# Patient Record
Sex: FEMALE | Race: WHITE | NOT HISPANIC OR LATINO | Employment: UNEMPLOYED | URBAN - NONMETROPOLITAN AREA
[De-identification: names, ages, dates, MRNs, and addresses within clinical notes are randomized per-mention and may not be internally consistent; named-entity substitution may affect disease eponyms.]

---

## 2020-10-17 ENCOUNTER — HISTORICAL (OUTPATIENT)
Dept: ADMINISTRATIVE | Facility: HOSPITAL | Age: 45
End: 2020-10-17

## 2022-02-25 LAB
AGE: 45
ALBUMIN SERPL-MCNC: 4.3 G/DL (ref 3.4–5)
ALBUMIN/GLOB SERPL: 1.6 {RATIO}
ALP SERPL-CCNC: 100 U/L (ref 50–144)
ALT SERPL-CCNC: 17 U/L (ref 1–45)
ANION GAP SERPL CALC-SCNC: 6 MMOL/L (ref 2–13)
AST SERPL-CCNC: 25 U/L (ref 14–36)
BASOPHILS # BLD AUTO: 0.04 10*3/UL (ref 0.01–0.08)
BASOPHILS NFR BLD AUTO: 0.6 % (ref 0.1–1.2)
BILIRUB SERPL-MCNC: 0.26 MG/DL (ref 0–1)
BUN SERPL-MCNC: 10 MG/DL (ref 7–20)
CALCIUM SERPL-MCNC: 9 MG/DL (ref 8.4–10.2)
CHLORIDE SERPL-SCNC: 105 MMOL/L (ref 94–110)
CHOLEST SERPL-MCNC: 244 MG/DL (ref 0–200)
CO2 SERPL-SCNC: 26 MMOL/L (ref 21–32)
CREAT SERPL-MCNC: 0.87 MG/DL (ref 0.52–1.04)
CREAT/UREA NIT SERPL: 11.5 (ref 12–20)
DEPRECATED CALCIDIOL+CALCIFEROL SERPL-MC: <20 NG/ML (ref 30–100)
EOSINOPHIL # BLD AUTO: 0.13 10*3/UL (ref 0.04–0.36)
EOSINOPHIL NFR BLD AUTO: 1.9 % (ref 0.7–7)
ERYTHROCYTE [DISTWIDTH] IN BLOOD BY AUTOMATED COUNT: 14.8 % (ref 11–14.5)
EST. AVERAGE GLUCOSE BLD GHB EST-MCNC: 105 MG/DL (ref 70–115)
GLOBULIN SER-MCNC: 2.7 G/DL (ref 2–3.9)
GLUCOSE SERPL-MCNC: 91 MG/DL (ref 70–115)
HBA1C MFR BLD: 5.5 % (ref 4–6)
HCT VFR BLD AUTO: 36 % (ref 36–48)
HDLC SERPL-MCNC: 50 MG/DL (ref 40–60)
HGB BLD-MCNC: 11.8 G/DL (ref 11.8–16)
IMM GRANULOCYTES # BLD AUTO: 0.06 10*3/UL (ref 0–0.03)
IMM GRANULOCYTES NFR BLD AUTO: 0.9 % (ref 0–0.5)
LDLC SERPL CALC-MCNC: 139.9 MG/DL (ref 30–100)
LYMPHOCYTES # BLD AUTO: 2.22 10*3/UL (ref 1.16–3.74)
LYMPHOCYTES NFR BLD AUTO: 32.2 % (ref 20–55)
MANUAL DIFF? (OHS): NORMAL
MCH RBC QN AUTO: 28.8 PG (ref 27–34)
MCHC RBC AUTO-ENTMCNC: 32.8 G/DL (ref 31–37)
MCV RBC AUTO: 87.8 FL (ref 79–99)
MONOCYTES # BLD AUTO: 0.34 10*3/UL (ref 0.24–0.36)
MONOCYTES NFR BLD AUTO: 4.9 % (ref 4.7–12.5)
NEUTROPHILS # BLD AUTO: 4.11 10*3/UL (ref 1.56–6.13)
NEUTROPHILS NFR BLD AUTO: 59.5 % (ref 37–73)
PLATELET # BLD AUTO: 344 10*3/UL (ref 140–371)
PMV BLD AUTO: 8.5 FL (ref 9.4–12.4)
POTASSIUM SERPL-SCNC: 4.5 MMOL/L (ref 3.5–5.1)
PROT SERPL-MCNC: 7 G/DL (ref 6.3–8.2)
RBC # BLD AUTO: 4.1 10*6/UL (ref 4–5.1)
SODIUM SERPL-SCNC: 137 MMOL/L (ref 135–145)
TRIGL SERPL-MCNC: 202 MG/DL (ref 30–200)
TSH SERPL-ACNC: 2.6 M[IU]/L (ref 0.36–3.74)
WBC # SPEC AUTO: 6.9 10*3/UL (ref 4–11.5)

## 2022-05-05 LAB
HUMAN PAPILLOMAVIRUS (HPV): NORMAL
PAP RECOMMENDATION EXT: NORMAL
PAP SMEAR: NORMAL

## 2022-05-15 ENCOUNTER — HISTORICAL (OUTPATIENT)
Dept: ADMINISTRATIVE | Facility: HOSPITAL | Age: 47
End: 2022-05-15

## 2022-05-20 NOTE — HISTORICAL [facility]ER
This is a historical note converted from Nain. Formatting and pictures may have been removed.  Please reference Nain for original formatting and attached multimedia. Chief Complaint  New pt. Former Dr. Lonodno pt  History of Present Illness  46 y/o WF here today to establish care; previous patient of Dr Londono. She has a PMH of GERD, chronic neck pain, GUSTABO,?Insomnia, migraines,?depression with anxiety, smoking 1/2PPD x 30 years-15 pack year hx.  ?  She states that she is doing well in general. She has chronic cervical radiculopathy and she takes Gabapentin 600mg daily, prescribed BID but only takes second dose if needed. Has burning pain down bilateral arms; no loss of motor or sensory function. Hx of cervical neck injury about 6 years ago and had surgery after that.  ?  She is not very active in life, fully independent. 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 Tolerates routine without complaint.  ?  Has acid reflux, takes Pantoprazole and it helps. She has to take it every day, she does not restrict or try to avoid any particular foods as the reflux occurs with ALL foods. Denies difficulty with choking or problems swallowing.  ?  She has a past history?of depression and anxiety.??Is currently coping well, mood is stable. Repeatedly states that she?just does not care about nothing.?She has been on Elavil for years. She denies SI/HI. Last suicide event was after sons death in March 2021. She also has a history of anxiety disorder and has been taking Klonopin, last refill was in November 2021. ?She was open to suggestion that we discontinue?use of that benzo at this time.?She is willing to go to [facility] in office.  ?  Unsure when last labs were done.  +family hx CAD (father), hypothyroid (mother), HLD?and diabetes (father)  Review of Systems  Constitutional:?no fever, fatigue  Eye:?no vision changes, eye redness, drainage, or pain  ENMT:?no sore throat, ear pain, sinus pain/congestion, nasal congestion/drainage  Respiratory:?no  cough, no wheezing, no shortness of breath  Cardiovascular:?no chest pain, no palpitations, no edema  Gastrointestinal:?no nausea, vomiting, diarrhea, or?abdominal pain  Genitourinary:?no dysuria, no urinary frequency or urgency, no hematuria  Hema/Lymph:?no abnormal bruising or bleeding  Endocrine:?no heat or cold intolerance, no excessive thirst or excessive urination  Musculoskeletal:?no muscle or joint pain, no joint swelling  Integumentary:?no skin rash or abnormal lesion  Neurologic: no headache, no dizziness  ?  Physical Exam  Vitals & Measurements  T:?36.4? ?C (Temporal Artery)? HR:?94(Peripheral)? BP:?120/80? SpO2:?98%?  HT:?152.00?cm? WT:?78.200?kg? BMI:?33.85? LMP:?02/25/2022 00:00 CST?  General:?AAOx3, in no acute distress  Eye: clear conjunctiva, eyelids normal  HENT:?TMs/ear canals clear, oropharynx without erythema/exudate  Neck: no thyromegaly or lymphadenopathy  Respiratory:?clear to auscultation bilaterally  Cardiovascular:?regular rate and rhythm without murmurs  Gastrointestinal:?soft, non-tender, with normal bowel sounds?  Integumentary: no rashes present, no peripheral edema  Musculoskeletal: full ROM of all extremities; no vertebral tenderness; steady gait  Neurologic: Cranial nerves grossly intact as tested, no sign of peripheral neurological deficit, motor/sensory function intact  ?  ?  Assessment/Plan  1.?Encounter to establish care ? Z76.89  Labs today; had coffee with cream 3.5 hours ago  2.?Anxiety ? F41.9  Discontinue Klonopin  Start?BuSpar 7.5 mg p.o. twice daily  3.?Depressive disorder ? F32.A  ?Continue Elavil  4.?Insomnia ? G47.00  5.?GERD - Gastro-esophageal reflux disease ? K21.9  Continue pantoprazole 40 mg daily.? Educated patient?on foods?that irritate/exacerbate reflux.? Advised her to avoid such foods,?remains sitting upright?at least an hour after eating.  6.?GUSTABO (iron deficiency anemia) ? D50.9  Labs pending  7.?Obesity ? E66.9  Advise?dietary adjustments?and  implementation?of routine exercise?in order to promote weight loss  8.?Cervical radiculopathy ? M54.12  ?Continue gabapentin?600 mg?daily  9.?Tobacco user ? Z72.0  ?Smoking cessation encouraged  10.?Lipid screening ? Z13.220  11.?Family history of diabetes mellitus ? Z83.3  12.?Thyroid disorder screen ? Z13.29  13.?Medication management ? Z79.899  14.?COVID-19 vaccine series completed ? Z92.29  1 month FU; discuss labs  ?  Patient verbalizes understanding of plan and agrees.? Call w any additional questions, concerns, or issues.? RTC prn or as?above.?   Problem List/Past Medical History  Ongoing  Anxiety  Depressive disorder  Family history of diabetes mellitus  GERD - Gastro-esophageal reflux disease  GUSTABO (iron deficiency anemia)  Insomnia  Medication management  Obesity  Thyroid disorder screen  Tobacco user  Historical  Depression  Procedure/Surgical History  Knee joint operation (2021)  Appendectomy  C Spine Fusion  Caesarean Section   Medications  amitriptyline 100 mg oral tablet, 100 mg= 1 tab(s), Oral, Once a day (at bedtime), 2 refills  busPIRone 7.5 mg oral tablet, 7.5 mg= 1 tab(s), Oral, BID, 2 refills  gabapentin 600 mg oral tablet, 600 mg= 1 tab(s), Oral, Daily, PRN, 2 refills  ibuprofen 600 mg oral tablet, 600 mg= 1 tab(s), Oral, q8hr, PRN, 1 refills  Pantoprazole 40 mg ORAL EC-Tablet, 40 mg= 1 tab(s), Oral, Daily, 2 refills  topiramate 50 mg oral tablet, 50 mg= 1 tab(s), Oral, Daily, 2 refills  Allergies  sertraline?(Urticaria)  Social History  Abuse/Neglect  No, No, Yes, 02/25/2022  Alcohol  Never, 05/15/2018  Substance Use  Current, Marijuana, 1-2 times per month, 02/25/2022  Never, 05/15/2018  Tobacco  10 or more cigarettes (1/2 pack or more)/day in last 30 days, Cigarettes, No, 30 year(s). 17 Years (Age started)., 02/25/2022  Family History  Diabetes mellitus type 2: Father.  Glaucoma.: Mother, Father and Sister.  Hyperlipidemia.: Father.  Hypertension.: Mother and Father.  Hypothyroidism.: Mother  and Sister.  Myocardial infarct: Son.  Immunizations  Vaccine Date Status Comments   COVID-19 mRNA, LNP-S, PF - Moderna 01/28/2022 Recorded    COVID-19 mRNA, LNP-S, PF - Moderna 12/31/2021 Recorded    influenza virus vaccine, inactivated 11/06/2014 Recorded Unit: Unknown  : [facility]   influenza virus vaccine, inactivated 10/01/2009 Recorded    diphtheria/pertussis, acel/tetanus ped 04/10/1991 Recorded    diphtheria/pertussis, acel/tetanus ped 04/29/1981 Recorded    diphtheria/pertussis, acel/tetanus ped 11/23/1979 Recorded    measles/mumps/rubella virus vaccine 08/05/1978 Recorded    diphtheria/pertussis, acel/tetanus ped 01/05/1978 Recorded    diphtheria/pertussis, acel/tetanus ped 1977 Recorded    diphtheria/pertussis, acel/tetanus ped 1977 Recorded    Health Maintenance  Health Maintenance  ???Pending?(in the next year)  ??? ??OverDue  ??? ? ? ?Influenza Vaccine due??10/01/21??and every 1??day(s)  ??? ??Due?  ??? ? ? ?Alcohol Misuse Screening due??01/02/22??and every 1??year(s)  ??? ? ? ?ADL Screening due??02/28/22??and every 1??year(s)  ??? ? ? ?Cervical Cancer Screening due??02/28/22??Unknown Frequency  ??? ? ? ?Tetanus Vaccine due??02/28/22??and every 10??year(s)  ??? ??Due In Future?  ??? ? ? ?Obesity Screening not due until??01/01/23??and every 1??year(s)  ??? ? ? ?Smoking Cessation not due until??01/01/23??and every 1??year(s)  ??? ? ? ?Blood Pressure Screening not due until??02/25/23??and every 1??year(s)  ??? ? ? ?Body Mass Index Check not due until??02/25/23??and every 1??year(s)  ??? ? ? ?Depression Screening not due until??02/25/23??and every 1??year(s)  ???Satisfied?(in the past 1 year)  ??? ??Satisfied?  ??? ? ? ?Blood Pressure Screening on??02/25/22.??Satisfied by Willa Smith LPN D.  ??? ? ? ?Body Mass Index Check on??02/25/22.??Satisfied by Alyssia Smith LPNa D.  ??? ? ? ?Depression Screening on??02/25/22.??Satisfied by Sarah URIBE Willa D.  ??? ? ?  ?Diabetes Screening on??02/25/22.??Satisfied by Contributor_system, [facility]  ??? ? ? ?Influenza Vaccine on??02/25/22.??Satisfied by Willa Smith LPN.  ??? ? ? ?Lipid Screening on??02/25/22.??Satisfied by Contributor_system, [facility]  ??? ? ? ?Obesity Screening on??02/25/22.??Satisfied by Willa Smith LPN.  ??? ? ? ?Smoking Cessation on??02/25/22.??Satisfied by Willa Smith LPN.  ?

## 2022-12-22 ENCOUNTER — HISTORICAL (OUTPATIENT)
Dept: ADMINISTRATIVE | Facility: HOSPITAL | Age: 47
End: 2022-12-22
Payer: MEDICAID

## 2023-01-20 ENCOUNTER — DOCUMENTATION ONLY (OUTPATIENT)
Dept: ADMINISTRATIVE | Facility: HOSPITAL | Age: 48
End: 2023-01-20
Payer: MEDICAID

## 2023-02-06 ENCOUNTER — OFFICE VISIT (OUTPATIENT)
Dept: BEHAVIORAL HEALTH | Facility: CLINIC | Age: 48
End: 2023-02-06
Payer: MEDICAID

## 2023-02-06 VITALS
HEART RATE: 88 BPM | WEIGHT: 188.63 LBS | SYSTOLIC BLOOD PRESSURE: 114 MMHG | DIASTOLIC BLOOD PRESSURE: 74 MMHG | TEMPERATURE: 97 F | OXYGEN SATURATION: 100 %

## 2023-02-06 DIAGNOSIS — G47.00 INSOMNIA, UNSPECIFIED TYPE: ICD-10-CM

## 2023-02-06 DIAGNOSIS — F33.1 MODERATE EPISODE OF RECURRENT MAJOR DEPRESSIVE DISORDER: Primary | ICD-10-CM

## 2023-02-06 DIAGNOSIS — F41.1 GENERALIZED ANXIETY DISORDER: ICD-10-CM

## 2023-02-06 DIAGNOSIS — Z72.0 TOBACCO USER: ICD-10-CM

## 2023-02-06 PROCEDURE — 99213 PR OFFICE/OUTPT VISIT, EST, LEVL III, 20-29 MIN: ICD-10-PCS | Mod: ,,, | Performed by: NURSE PRACTITIONER

## 2023-02-06 PROCEDURE — 99213 OFFICE O/P EST LOW 20 MIN: CPT | Mod: ,,, | Performed by: NURSE PRACTITIONER

## 2023-02-06 RX ORDER — ALBUTEROL SULFATE 90 UG/1
1 AEROSOL, METERED RESPIRATORY (INHALATION) 4 TIMES DAILY PRN
COMMUNITY
Start: 2022-11-22

## 2023-02-06 RX ORDER — ROPINIROLE 0.5 MG/1
0.5 TABLET, FILM COATED ORAL NIGHTLY
COMMUNITY
Start: 2023-01-23 | End: 2023-08-23

## 2023-02-06 RX ORDER — TOPIRAMATE 50 MG/1
50 TABLET, FILM COATED ORAL 2 TIMES DAILY
COMMUNITY
Start: 2023-01-23 | End: 2023-02-06 | Stop reason: SDUPTHER

## 2023-02-06 RX ORDER — ARIPIPRAZOLE 10 MG/1
10 TABLET ORAL DAILY
COMMUNITY
Start: 2023-01-03 | End: 2023-02-06 | Stop reason: SDUPTHER

## 2023-02-06 RX ORDER — PANTOPRAZOLE SODIUM 40 MG/1
40 TABLET, DELAYED RELEASE ORAL DAILY
COMMUNITY
Start: 2023-01-23 | End: 2023-03-23

## 2023-02-06 RX ORDER — ERGOCALCIFEROL 1.25 MG/1
50000 CAPSULE ORAL
COMMUNITY
Start: 2023-01-23 | End: 2023-02-22

## 2023-02-06 RX ORDER — AMITRIPTYLINE HYDROCHLORIDE 150 MG/1
150 TABLET ORAL NIGHTLY
COMMUNITY
Start: 2023-01-23 | End: 2023-02-06 | Stop reason: SDUPTHER

## 2023-02-06 RX ORDER — GABAPENTIN 600 MG/1
600 TABLET ORAL DAILY PRN
COMMUNITY
Start: 2023-01-23 | End: 2023-08-23

## 2023-02-06 RX ORDER — TOPIRAMATE 50 MG/1
50 TABLET, FILM COATED ORAL 2 TIMES DAILY
Qty: 60 TABLET | Refills: 1 | Status: SHIPPED | OUTPATIENT
Start: 2023-02-06 | End: 2023-03-28

## 2023-02-06 RX ORDER — CLONAZEPAM 1 MG/1
1 TABLET ORAL DAILY PRN
COMMUNITY
Start: 2022-12-20 | End: 2023-02-17

## 2023-02-06 RX ORDER — ARIPIPRAZOLE 10 MG/1
10 TABLET ORAL DAILY
Qty: 30 TABLET | Refills: 1 | Status: SHIPPED | OUTPATIENT
Start: 2023-02-06 | End: 2023-04-03 | Stop reason: SDUPTHER

## 2023-02-06 RX ORDER — AMITRIPTYLINE HYDROCHLORIDE 150 MG/1
150 TABLET ORAL NIGHTLY
Qty: 30 TABLET | Refills: 1 | Status: SHIPPED | OUTPATIENT
Start: 2023-02-06 | End: 2023-04-03 | Stop reason: SDUPTHER

## 2023-02-06 NOTE — PROGRESS NOTES
"PSYCHIATRIC FOLLOW-UP VISIT NOTE    Chief Complaint   Patient presents with    Mood     "I have no complaints."         History of Present Illness  48 y.o. year old White female with hx of MDD, OLIMPIA, insomnia, and tobacco use seen today for follow-up appointment and medication management.  States she is been doing well overall since her last visit.  Her daughter gave birth to patient's 1st grandchild earlier this month.  Baby did arrive approximately 2 months early but patient states her daughter and grandson are both doing well at this time.  She was trying to quit smoking and was down to approximately 2 cigarettes per day but due to the high-risk birth she is currently at approximately 5 cigarettes per day.  Reassurance is provided and encouraged patient to continue cessation efforts.  Not interested in medication for this at this time.  Feels moods have been stable she denies any recent depression.  Anxiety has been at a level she is able to cope with effectively.  Sleeping well at night and feels rested in the morning.  Denies any side effects from current medication regimen. Patient denies SI/HI. Denies hallucinations and does not appear to be responding to internal stimuli or be internally preoccupied. No manic symptoms noted.  Tolerating SGA therapy without serious side effects. No NMS s/s. No EPS or TD symptoms noted. AIMS=0.      Objective:     Vitals:  Vitals:    02/06/23 0708   BP: 114/74   BP Location: Left arm   Patient Position: Sitting   Pulse: 88   Temp: 97.3 °F (36.3 °C)   TempSrc: Temporal   SpO2: 100%   Weight: 85.5 kg (188 lb 9.6 oz)       Wt Readings from Last 3 Encounters:   02/06/23 0708 85.5 kg (188 lb 9.6 oz)         Medication:    Current Outpatient Medications:     albuterol (PROVENTIL/VENTOLIN HFA) 90 mcg/actuation inhaler, Inhale 1 puff into the lungs 4 (four) times daily as needed., Disp: , Rfl:     clonazePAM (KLONOPIN) 1 MG tablet, Take 1 mg by mouth daily as needed., Disp: , Rfl:     " "gabapentin (NEURONTIN) 600 MG tablet, Take 600 mg by mouth daily as needed., Disp: , Rfl:     pantoprazole (PROTONIX) 40 MG tablet, Take 40 mg by mouth once daily., Disp: , Rfl:     rOPINIRole (REQUIP) 0.5 MG tablet, Take 0.5 mg by mouth every evening., Disp: , Rfl:     VITAMIN D2 1,250 mcg (50,000 unit) capsule, Take 50,000 Units by mouth every 7 days., Disp: , Rfl:     amitriptyline (ELAVIL) 150 MG Tab, Take 1 tablet (150 mg total) by mouth every evening., Disp: 30 tablet, Rfl: 1    ARIPiprazole (ABILIFY) 10 MG Tab, Take 1 tablet (10 mg total) by mouth once daily., Disp: 30 tablet, Rfl: 1    topiramate (TOPAMAX) 50 MG tablet, Take 1 tablet (50 mg total) by mouth 2 (two) times daily., Disp: 60 tablet, Rfl: 1       Significant Labs: - none at this time    Significant Imaging: - none at this time    Physical Exam  Vitals and nursing note reviewed.   Constitutional:       General: She is awake.      Appearance: Normal appearance.   Musculoskeletal:      Comments: Full ROM   Neurological:      Mental Status: She is alert.   Psychiatric:         Attention and Perception: Attention and perception normal. She does not perceive auditory or visual hallucinations.         Mood and Affect: Affect normal.         Speech: Speech normal.         Behavior: Behavior is cooperative.         Thought Content: Thought content does not include homicidal or suicidal ideation.         Cognition and Memory: Cognition and memory normal.        Review of Systems     Mental Status Exam:  Presentation:  - Appearance: 48 y.o. year old White female, appears stated age, appears Casually dressed and Well groomed  - Motility: Erect when standing, Steady gait, No EPS or Tremors, No psychomotor agitation or retardation appreciated  - Behavior: calm, cooperative, good eye contact  Speech:  - Character/Organization: spontaneous, fluent, normal volume, normal rate, normal rhythm  Emotional State:  - Mood: "happy"   - Affect: congruent and " appropriate  Thought:  - Process: logical, linear, organized , goal-directed  - Preoccupations: no ruminations, rituals, or phobias appreciated  - Delusions: no persecutory, paranoid, or grandiose delusions appreciated  - Perception: denies AVH, not actively responding to internal stimuli  - SI/HI: denies/denies  Sensorium & Intellect:  - Sensorium: AAOx4  - Memory: intact to recent and remote events  - Attention/Concentration: good/good  - Insight/Judgement: good/good    Gait: normal swing and stance  MSK:no rigidity appreciated    All other systems without acute issues unless noted in HPI      Assessment/Plan      ICD-10-CM ICD-9-CM    1. Moderate episode of recurrent major depressive disorder  F33.1 296.32 ARIPiprazole (ABILIFY) 10 MG Tab      amitriptyline (ELAVIL) 150 MG Tab      topiramate (TOPAMAX) 50 MG tablet      2. Generalized anxiety disorder  F41.1 300.02       3. Insomnia, unspecified type  G47.00 780.52       4. Tobacco user  Z72.0 305.1          Continue current medications without change    Encouraged smoking cessation and reinforced negative effects of continued use. Assistance with smoking cessation was offered, including medications, counseling, printed information, and referral to smoking cessation program. Encouraged patient to visit [email or web address] for further information and resources.     checked. Results as expected. No suspected diversion or abuse of controlled substances.    Reviewed non-pharmacologic coping skills to decrease anxiety, such as deep breathing, journaling, exercise, recognizing triggers, meditation, healthy diet and exercise, routine sleep schedule, negative thought recognition, time for hobbies/interests, relaxation techniques, avoidance of substance abuse, limiting caffeine, benefits of counseling, and biofeedback. Patient verbalized understanding.      Follow up in about 2 months (around 4/6/2023) for Medication Management.    DANIKA Varela

## 2023-04-03 ENCOUNTER — OFFICE VISIT (OUTPATIENT)
Dept: BEHAVIORAL HEALTH | Facility: CLINIC | Age: 48
End: 2023-04-03
Payer: MEDICAID

## 2023-04-03 VITALS
SYSTOLIC BLOOD PRESSURE: 114 MMHG | OXYGEN SATURATION: 100 % | HEART RATE: 97 BPM | BODY MASS INDEX: 37.62 KG/M2 | TEMPERATURE: 98 F | HEIGHT: 60 IN | WEIGHT: 191.63 LBS | DIASTOLIC BLOOD PRESSURE: 78 MMHG

## 2023-04-03 DIAGNOSIS — F33.1 MODERATE EPISODE OF RECURRENT MAJOR DEPRESSIVE DISORDER: Primary | ICD-10-CM

## 2023-04-03 DIAGNOSIS — F41.1 GENERALIZED ANXIETY DISORDER: ICD-10-CM

## 2023-04-03 DIAGNOSIS — F17.200 NEEDS SMOKING CESSATION EDUCATION: ICD-10-CM

## 2023-04-03 DIAGNOSIS — G47.00 INSOMNIA, UNSPECIFIED TYPE: ICD-10-CM

## 2023-04-03 DIAGNOSIS — Z72.0 TOBACCO USER: ICD-10-CM

## 2023-04-03 PROCEDURE — 3074F SYST BP LT 130 MM HG: CPT | Mod: CPTII,,, | Performed by: NURSE PRACTITIONER

## 2023-04-03 PROCEDURE — 3078F PR MOST RECENT DIASTOLIC BLOOD PRESSURE < 80 MM HG: ICD-10-PCS | Mod: CPTII,,, | Performed by: NURSE PRACTITIONER

## 2023-04-03 PROCEDURE — 1159F PR MEDICATION LIST DOCUMENTED IN MEDICAL RECORD: ICD-10-PCS | Mod: CPTII,,, | Performed by: NURSE PRACTITIONER

## 2023-04-03 PROCEDURE — 1159F MED LIST DOCD IN RCRD: CPT | Mod: CPTII,,, | Performed by: NURSE PRACTITIONER

## 2023-04-03 PROCEDURE — 99214 PR OFFICE/OUTPT VISIT, EST, LEVL IV, 30-39 MIN: ICD-10-PCS | Mod: ,,, | Performed by: NURSE PRACTITIONER

## 2023-04-03 PROCEDURE — 3008F PR BODY MASS INDEX (BMI) DOCUMENTED: ICD-10-PCS | Mod: CPTII,,, | Performed by: NURSE PRACTITIONER

## 2023-04-03 PROCEDURE — 3074F PR MOST RECENT SYSTOLIC BLOOD PRESSURE < 130 MM HG: ICD-10-PCS | Mod: CPTII,,, | Performed by: NURSE PRACTITIONER

## 2023-04-03 PROCEDURE — 3008F BODY MASS INDEX DOCD: CPT | Mod: CPTII,,, | Performed by: NURSE PRACTITIONER

## 2023-04-03 PROCEDURE — 99214 OFFICE O/P EST MOD 30 MIN: CPT | Mod: ,,, | Performed by: NURSE PRACTITIONER

## 2023-04-03 PROCEDURE — 1160F PR REVIEW ALL MEDS BY PRESCRIBER/CLIN PHARMACIST DOCUMENTED: ICD-10-PCS | Mod: CPTII,,, | Performed by: NURSE PRACTITIONER

## 2023-04-03 PROCEDURE — 1160F RVW MEDS BY RX/DR IN RCRD: CPT | Mod: CPTII,,, | Performed by: NURSE PRACTITIONER

## 2023-04-03 PROCEDURE — 3078F DIAST BP <80 MM HG: CPT | Mod: CPTII,,, | Performed by: NURSE PRACTITIONER

## 2023-04-03 RX ORDER — ARIPIPRAZOLE 10 MG/1
10 TABLET ORAL DAILY
Qty: 30 TABLET | Refills: 1 | Status: SHIPPED | OUTPATIENT
Start: 2023-04-03 | End: 2023-05-30 | Stop reason: SDUPTHER

## 2023-04-03 RX ORDER — AMITRIPTYLINE HYDROCHLORIDE 150 MG/1
150 TABLET ORAL NIGHTLY
Qty: 30 TABLET | Refills: 1 | Status: SHIPPED | OUTPATIENT
Start: 2023-04-03 | End: 2023-07-21

## 2023-04-03 RX ORDER — CLONAZEPAM 1 MG/1
1 TABLET ORAL DAILY PRN
Qty: 15 TABLET | Refills: 1 | Status: SHIPPED | OUTPATIENT
Start: 2023-04-03 | End: 2023-05-23

## 2023-04-03 RX ORDER — TOPIRAMATE 50 MG/1
100 TABLET, FILM COATED ORAL 2 TIMES DAILY
Qty: 60 TABLET | Refills: 1 | Status: SHIPPED | OUTPATIENT
Start: 2023-04-03 | End: 2023-05-30 | Stop reason: SDUPTHER

## 2023-04-03 NOTE — PROGRESS NOTES
PSYCHIATRIC FOLLOW-UP VISIT NOTE    Chief Complaint   Patient presents with    Depression     F/u         History of Present Illness  48 y.o. year old White female with hx of MDD, OLIMPIA, and insomnia seen today for follow-up appointment and medication management.    Objective:     Vitals:  Vitals:    04/03/23 0717   BP: 114/78   BP Location: Left arm   Patient Position: Sitting   Pulse: 97   Temp: 97.9 °F (36.6 °C)   TempSrc: Temporal   SpO2: 100%   Weight: 86.9 kg (191 lb 9.6 oz)   Height: 5' (1.524 m)       Wt Readings from Last 3 Encounters:   04/03/23 0717 86.9 kg (191 lb 9.6 oz)   02/06/23 0708 85.5 kg (188 lb 9.6 oz)         Medication:    Current Outpatient Medications:     albuterol (PROVENTIL/VENTOLIN HFA) 90 mcg/actuation inhaler, Inhale 1 puff into the lungs 4 (four) times daily as needed., Disp: , Rfl:     gabapentin (NEURONTIN) 600 MG tablet, Take 600 mg by mouth daily as needed., Disp: , Rfl:     pantoprazole (PROTONIX) 40 MG tablet, TAKE ONE(1) TAB BY MOUTH ONCE A DAY FOR STOMACH &/OR REFLUX /DO NOT CRUSH OR CHEW, Disp: 30 tablet, Rfl: 5    rOPINIRole (REQUIP) 0.5 MG tablet, Take 0.5 mg by mouth every evening., Disp: , Rfl:     amitriptyline (ELAVIL) 150 MG Tab, Take 1 tablet (150 mg total) by mouth every evening., Disp: 30 tablet, Rfl: 1    ARIPiprazole (ABILIFY) 10 MG Tab, Take 1 tablet (10 mg total) by mouth once daily., Disp: 30 tablet, Rfl: 1    clonazePAM (KLONOPIN) 1 MG tablet, Take 1 tablet (1 mg total) by mouth daily as needed for Anxiety., Disp: 15 tablet, Rfl: 1    topiramate (TOPAMAX) 50 MG tablet, Take 2 tablets (100 mg total) by mouth 2 (two) times daily., Disp: 60 tablet, Rfl: 1    VITAMIN D2 1,250 mcg (50,000 unit) capsule, TAKE ONE(1) CAP BY MOUTH ONCE A WEEK WITH FOOD ON THE SAME DAY EACH WEEK FOR VIT D /DO NOT CRUSH OR CHEW (Patient not taking: Reported on 4/3/2023), Disp: 4 capsule, Rfl: 1       Significant Labs: - none at this time    Significant Imaging: - none at this  "time    Physical Exam  Vitals and nursing note reviewed.   Constitutional:       General: She is awake.      Appearance: Normal appearance.   Musculoskeletal:      Comments: Full ROM   Neurological:      Mental Status: She is alert.   Psychiatric:         Attention and Perception: Attention and perception normal. She does not perceive auditory or visual hallucinations.         Mood and Affect: Affect normal.         Speech: Speech normal.         Behavior: Behavior is cooperative.         Thought Content: Thought content does not include homicidal or suicidal ideation.         Cognition and Memory: Cognition and memory normal.        Review of Systems     Mental Status Exam:  Presentation:  - Appearance: 48 y.o. year old White female, appears stated age, appears Casually dressed and Well groomed  - Motility: Erect when standing, Steady gait, No EPS or Tremors, No psychomotor agitation or retardation appreciated  - Behavior: calm, cooperative, good eye contact  Speech:  - Character/Organization: spontaneous, fluent, normal volume, normal rate, normal rhythm  Emotional State:  - Mood: "pretty good"   - Affect: congruent and appropriate  Thought:  - Process: logical, linear, organized , goal-directed  - Preoccupations: no ruminations, rituals, or phobias appreciated  - Delusions: no persecutory, paranoid, or grandiose delusions appreciated  - Perception: denies AVH, not actively responding to internal stimuli  - SI/HI: denies/denies  Sensorium & Intellect:  - Sensorium: AAOx4  - Memory: intact to recent and remote events  - Attention/Concentration: good/good  - Insight/Judgement: good/good    Gait: normal swing and stance  MSK:no rigidity appreciated    All other systems without acute issues unless noted in HPI      Assessment/Plan      ICD-10-CM ICD-9-CM    1. Moderate episode of recurrent major depressive disorder  F33.1 296.32 amitriptyline (ELAVIL) 150 MG Tab      ARIPiprazole (ABILIFY) 10 MG Tab      topiramate " (TOPAMAX) 50 MG tablet      2. Generalized anxiety disorder  F41.1 300.02 clonazePAM (KLONOPIN) 1 MG tablet      3. Insomnia, unspecified type  G47.00 780.52       4. Tobacco user  Z72.0 305.1          Increase topiramate to 100mg BID for medication associated weight gain    Continue other medications without change    May consider changing Abilify to an alternative SGA if weight gain continues.    Encouraged smoking cessation and reinforced negative effects of continued use. Assistance with smoking cessation was offered, including medications, counseling, printed information, and referral to smoking cessation program. Encouraged patient to visit [email or web address] for further information and resources.    Continue with healthy lifestyle changes    Potential side effects and risks vs benefits of current treatment plan reviewed with patient. Applicable black box warnings reviewed. Encouraged patient not to alter dosages or abruptly discontinue medications without contacting prescriber first, due to risk of worsening symptoms and decompensation of mental status. Warned of risks associated with herbal remedies and supplements while taking psychotropic medications and of the need to consult prescriber prior to adding any of these to current regimen. Patient should abstain from abuse of alcohol, prescription medications, and illicit drugs. Reviewed when to contact clinic and/or seek emergent care, such as but not limited to, onset/worsening SI/HI, hallucinations, delusions, manic symptoms. Pt verbalized understanding and agreement of these warnings/recommendations and verbally consented to treatment plan.        Follow up in about 2 months (around 6/3/2023) for Medication Management.    Maynor Charles, PAULETTEP

## 2023-04-21 ENCOUNTER — TELEPHONE (OUTPATIENT)
Dept: SMOKING CESSATION | Facility: CLINIC | Age: 48
End: 2023-04-21
Payer: MEDICAID

## 2023-04-21 NOTE — TELEPHONE ENCOUNTER
Attempted to contact patient regarding her missed [facility] appointment.  Patient did not answer.  Counselor left a voice message with contact information.

## 2023-05-23 DIAGNOSIS — F41.1 GENERALIZED ANXIETY DISORDER: ICD-10-CM

## 2023-05-23 RX ORDER — TOPIRAMATE 100 MG/1
TABLET, FILM COATED ORAL
Qty: 60 TABLET | Refills: 1 | Status: SHIPPED | OUTPATIENT
Start: 2023-05-23 | End: 2023-05-30 | Stop reason: SDUPTHER

## 2023-05-23 RX ORDER — CLONAZEPAM 1 MG/1
TABLET ORAL
Qty: 15 TABLET | Refills: 1 | Status: SHIPPED | OUTPATIENT
Start: 2023-05-23 | End: 2023-06-23

## 2023-05-30 ENCOUNTER — OFFICE VISIT (OUTPATIENT)
Dept: BEHAVIORAL HEALTH | Facility: CLINIC | Age: 48
End: 2023-05-30
Payer: MEDICAID

## 2023-05-30 VITALS
SYSTOLIC BLOOD PRESSURE: 106 MMHG | OXYGEN SATURATION: 100 % | TEMPERATURE: 97 F | BODY MASS INDEX: 37.3 KG/M2 | DIASTOLIC BLOOD PRESSURE: 76 MMHG | WEIGHT: 190 LBS | HEART RATE: 95 BPM | HEIGHT: 60 IN

## 2023-05-30 DIAGNOSIS — Z72.0 TOBACCO USER: ICD-10-CM

## 2023-05-30 DIAGNOSIS — G47.00 INSOMNIA, UNSPECIFIED TYPE: ICD-10-CM

## 2023-05-30 DIAGNOSIS — F41.1 GENERALIZED ANXIETY DISORDER: ICD-10-CM

## 2023-05-30 DIAGNOSIS — F33.1 MODERATE EPISODE OF RECURRENT MAJOR DEPRESSIVE DISORDER: Primary | ICD-10-CM

## 2023-05-30 PROCEDURE — 1159F PR MEDICATION LIST DOCUMENTED IN MEDICAL RECORD: ICD-10-PCS | Mod: CPTII,,, | Performed by: NURSE PRACTITIONER

## 2023-05-30 PROCEDURE — 3078F PR MOST RECENT DIASTOLIC BLOOD PRESSURE < 80 MM HG: ICD-10-PCS | Mod: CPTII,,, | Performed by: NURSE PRACTITIONER

## 2023-05-30 PROCEDURE — 1159F MED LIST DOCD IN RCRD: CPT | Mod: CPTII,,, | Performed by: NURSE PRACTITIONER

## 2023-05-30 PROCEDURE — 3008F BODY MASS INDEX DOCD: CPT | Mod: CPTII,,, | Performed by: NURSE PRACTITIONER

## 2023-05-30 PROCEDURE — 1160F RVW MEDS BY RX/DR IN RCRD: CPT | Mod: CPTII,,, | Performed by: NURSE PRACTITIONER

## 2023-05-30 PROCEDURE — 90833 PSYTX W PT W E/M 30 MIN: CPT | Mod: ,,, | Performed by: NURSE PRACTITIONER

## 2023-05-30 PROCEDURE — 3008F PR BODY MASS INDEX (BMI) DOCUMENTED: ICD-10-PCS | Mod: CPTII,,, | Performed by: NURSE PRACTITIONER

## 2023-05-30 PROCEDURE — 3074F SYST BP LT 130 MM HG: CPT | Mod: CPTII,,, | Performed by: NURSE PRACTITIONER

## 2023-05-30 PROCEDURE — 90833 PR PSYCHOTHERAPY W/PATIENT W/E&M, 30 MIN (ADD ON): ICD-10-PCS | Mod: ,,, | Performed by: NURSE PRACTITIONER

## 2023-05-30 PROCEDURE — 3078F DIAST BP <80 MM HG: CPT | Mod: CPTII,,, | Performed by: NURSE PRACTITIONER

## 2023-05-30 PROCEDURE — 1160F PR REVIEW ALL MEDS BY PRESCRIBER/CLIN PHARMACIST DOCUMENTED: ICD-10-PCS | Mod: CPTII,,, | Performed by: NURSE PRACTITIONER

## 2023-05-30 PROCEDURE — 99214 OFFICE O/P EST MOD 30 MIN: CPT | Mod: ,,, | Performed by: NURSE PRACTITIONER

## 2023-05-30 PROCEDURE — 3074F PR MOST RECENT SYSTOLIC BLOOD PRESSURE < 130 MM HG: ICD-10-PCS | Mod: CPTII,,, | Performed by: NURSE PRACTITIONER

## 2023-05-30 PROCEDURE — 99214 PR OFFICE/OUTPT VISIT, EST, LEVL IV, 30-39 MIN: ICD-10-PCS | Mod: ,,, | Performed by: NURSE PRACTITIONER

## 2023-05-30 RX ORDER — TOPIRAMATE 100 MG/1
100 TABLET, FILM COATED ORAL 2 TIMES DAILY
Qty: 60 TABLET | Refills: 1 | Status: SHIPPED | OUTPATIENT
Start: 2023-05-30 | End: 2023-07-21

## 2023-05-30 RX ORDER — ARIPIPRAZOLE 10 MG/1
10 TABLET ORAL DAILY
Qty: 30 TABLET | Refills: 1 | Status: SHIPPED | OUTPATIENT
Start: 2023-05-30 | End: 2023-08-02 | Stop reason: SDUPTHER

## 2023-05-30 NOTE — PROGRESS NOTES
"PSYCHIATRIC FOLLOW-UP VISIT NOTE    Chief Complaint   Patient presents with    Mood     "I have no complaints."         History of Present Illness  48 y.o. year old White female with hx of MD, OLIMPIA, insomnia, and tobacco use seen today for follow-up appointment and medication management.  Reports she is been doing well overall since her last visit.  Denies any recent depression.  No anhedonia, isolative behaviors, irritability, feelings of guilt and shame, or thoughts of self-harm.  Anxiety also well-controlled for the most part.  Denies any moderate to severe symptoms and denies any recent panic attacks.  She is sleeping well and feels rested in the morning.  Denies any side effects from current medication regimen.  There is continued family discord between herself and her mother due to mother's poor boundaries with patient's daughter and granddaughter.  Does not feel this is a severe stressor at this time but it was something she was trying to keep under control so that my daughter does not have to deal with it.  We spent some time discussing communication skills and keeping in setting boundaries. Patient denies SI/HI. Denies hallucinations and does not appear to be responding to internal stimuli or be internally preoccupied. No manic symptoms noted. Tolerating SGA therapy without serious side effects. No NMS s/s. No EPS or TD symptoms noted. AIMS=0.        Objective:     Vitals:  Vitals:    05/30/23 0706   BP: 106/76   BP Location: Left arm   Patient Position: Sitting   Pulse: 95   Temp: 97.2 °F (36.2 °C)   TempSrc: Temporal   SpO2: 100%   Weight: 86.2 kg (190 lb)   Height: 5' (1.524 m)       Wt Readings from Last 3 Encounters:   05/30/23 0706 86.2 kg (190 lb)   04/03/23 0717 86.9 kg (191 lb 9.6 oz)   02/06/23 0708 85.5 kg (188 lb 9.6 oz)         Medication:    Current Outpatient Medications:     albuterol (PROVENTIL/VENTOLIN HFA) 90 mcg/actuation inhaler, Inhale 1 puff into the lungs 4 (four) times daily as " needed., Disp: , Rfl:     amitriptyline (ELAVIL) 150 MG Tab, Take 1 tablet (150 mg total) by mouth every evening., Disp: 30 tablet, Rfl: 1    clonazePAM (KLONOPIN) 1 MG tablet, TAKE ONE(1) TAB BY MOUTH ONCE A DAY AS NEEDED FOR ANXIETY, Disp: 15 tablet, Rfl: 1    gabapentin (NEURONTIN) 600 MG tablet, Take 600 mg by mouth daily as needed., Disp: , Rfl:     pantoprazole (PROTONIX) 40 MG tablet, TAKE ONE(1) TAB BY MOUTH ONCE A DAY FOR STOMACH &/OR REFLUX /DO NOT CRUSH OR CHEW, Disp: 30 tablet, Rfl: 5    rOPINIRole (REQUIP) 0.5 MG tablet, Take 0.5 mg by mouth every evening., Disp: , Rfl:     ARIPiprazole (ABILIFY) 10 MG Tab, Take 1 tablet (10 mg total) by mouth once daily., Disp: 30 tablet, Rfl: 1    topiramate (TOPAMAX) 100 MG tablet, Take 1 tablet (100 mg total) by mouth 2 (two) times daily., Disp: 60 tablet, Rfl: 1       Significant Labs: - none at this time    Significant Imaging: - none at this time    Physical Exam  Vitals and nursing note reviewed.   Constitutional:       General: She is awake.      Appearance: Normal appearance.   Musculoskeletal:      Comments: Full ROM   Neurological:      Mental Status: She is alert.   Psychiatric:         Attention and Perception: Attention and perception normal. She does not perceive auditory or visual hallucinations.         Mood and Affect: Affect normal.         Speech: Speech normal.         Behavior: Behavior is cooperative.         Thought Content: Thought content does not include homicidal or suicidal ideation.         Cognition and Memory: Cognition and memory normal.        Review of Systems     Mental Status Exam:  Presentation:  - Appearance: 48 y.o. year old White female, appears stated age, appears Casually dressed and Well groomed  - Motility: Erect when standing, Steady gait, No EPS or Tremors, No psychomotor agitation or retardation appreciated  - Behavior: calm, cooperative, good eye contact  Speech:  - Character/Organization: spontaneous, fluent, normal  "volume, normal rate, normal rhythm  Emotional State:  - Mood: "happy"   - Affect: congruent and appropriate  Thought:  - Process: logical, linear, organized , goal-directed  - Preoccupations: no ruminations, rituals, or phobias appreciated  - Delusions: no persecutory, paranoid, or grandiose delusions appreciated  - Perception: denies AVH, not actively responding to internal stimuli  - SI/HI: denies/denies  Sensorium & Intellect:  - Sensorium: AAOx4  - Memory: intact to recent and remote events  - Attention/Concentration: good/good  - Insight/Judgement: good/good    Gait: normal swing and stance  MSK:no rigidity appreciated    All other systems without acute issues unless noted in HPI      Assessment/Plan      ICD-10-CM ICD-9-CM    1. Moderate episode of recurrent major depressive disorder  F33.1 296.32 ARIPiprazole (ABILIFY) 10 MG Tab      2. Generalized anxiety disorder  F41.1 300.02       3. Insomnia, unspecified type  G47.00 780.52       4. Tobacco user  Z72.0 305.1          Continue current medications without change    Potential side effects and risks vs benefits of current treatment plan reviewed with patient. Applicable black box warnings reviewed. Encouraged patient not to alter dosages or abruptly discontinue medications without contacting prescriber first, due to risk of worsening symptoms and decompensation of mental status. Warned of risks associated with herbal remedies and supplements while taking psychotropic medications and of the need to consult prescriber prior to adding any of these to current regimen. Patient should abstain from abuse of alcohol, prescription medications, and illicit drugs. Reviewed when to contact clinic and/or seek emergent care, such as but not limited to, onset/worsening SI/HI, hallucinations, delusions, manic symptoms. Pt verbalized understanding and agreement of these warnings/recommendations and verbally consented to treatment plan.     checked. Results as expected. No " suspected diversion or abuse of controlled substances.    Excess of 16 minutes spent discussing communication strategies and keeping in setting boundaries to decrease family discord between patient, her mother, her child, and her grandchild.  Brief supportive therapy techniques employed    Follow up in about 2 months (around 7/30/2023) for Medication Management.    Maynor Charles, PAULETTEP

## 2023-06-23 DIAGNOSIS — F41.1 GENERALIZED ANXIETY DISORDER: ICD-10-CM

## 2023-06-23 RX ORDER — CLONAZEPAM 1 MG/1
TABLET ORAL
Qty: 15 TABLET | Refills: 1 | Status: SHIPPED | OUTPATIENT
Start: 2023-06-23 | End: 2023-08-02 | Stop reason: SDUPTHER

## 2023-07-21 DIAGNOSIS — F33.1 MODERATE EPISODE OF RECURRENT MAJOR DEPRESSIVE DISORDER: ICD-10-CM

## 2023-07-21 RX ORDER — AMITRIPTYLINE HYDROCHLORIDE 150 MG/1
TABLET ORAL
Qty: 30 TABLET | Refills: 1 | Status: SHIPPED | OUTPATIENT
Start: 2023-07-21 | End: 2023-08-02 | Stop reason: SDUPTHER

## 2023-07-21 RX ORDER — TOPIRAMATE 100 MG/1
TABLET, FILM COATED ORAL
Qty: 60 TABLET | Refills: 1 | Status: SHIPPED | OUTPATIENT
Start: 2023-07-21 | End: 2024-01-19 | Stop reason: SDUPTHER

## 2023-08-02 ENCOUNTER — OFFICE VISIT (OUTPATIENT)
Dept: BEHAVIORAL HEALTH | Facility: CLINIC | Age: 48
End: 2023-08-02
Payer: MEDICAID

## 2023-08-02 VITALS
HEART RATE: 94 BPM | WEIGHT: 191 LBS | TEMPERATURE: 98 F | DIASTOLIC BLOOD PRESSURE: 78 MMHG | HEIGHT: 60 IN | BODY MASS INDEX: 37.5 KG/M2 | OXYGEN SATURATION: 100 % | SYSTOLIC BLOOD PRESSURE: 136 MMHG

## 2023-08-02 DIAGNOSIS — G47.00 INSOMNIA, UNSPECIFIED TYPE: ICD-10-CM

## 2023-08-02 DIAGNOSIS — F41.1 GENERALIZED ANXIETY DISORDER: ICD-10-CM

## 2023-08-02 DIAGNOSIS — Z72.0 TOBACCO USER: ICD-10-CM

## 2023-08-02 DIAGNOSIS — F33.1 MODERATE EPISODE OF RECURRENT MAJOR DEPRESSIVE DISORDER: Primary | ICD-10-CM

## 2023-08-02 PROCEDURE — 3008F PR BODY MASS INDEX (BMI) DOCUMENTED: ICD-10-PCS | Mod: CPTII,,, | Performed by: NURSE PRACTITIONER

## 2023-08-02 PROCEDURE — 3078F DIAST BP <80 MM HG: CPT | Mod: CPTII,,, | Performed by: NURSE PRACTITIONER

## 2023-08-02 PROCEDURE — 1159F PR MEDICATION LIST DOCUMENTED IN MEDICAL RECORD: ICD-10-PCS | Mod: CPTII,,, | Performed by: NURSE PRACTITIONER

## 2023-08-02 PROCEDURE — 99214 PR OFFICE/OUTPT VISIT, EST, LEVL IV, 30-39 MIN: ICD-10-PCS | Mod: ,,, | Performed by: NURSE PRACTITIONER

## 2023-08-02 PROCEDURE — 3075F SYST BP GE 130 - 139MM HG: CPT | Mod: CPTII,,, | Performed by: NURSE PRACTITIONER

## 2023-08-02 PROCEDURE — 3008F BODY MASS INDEX DOCD: CPT | Mod: CPTII,,, | Performed by: NURSE PRACTITIONER

## 2023-08-02 PROCEDURE — 1160F PR REVIEW ALL MEDS BY PRESCRIBER/CLIN PHARMACIST DOCUMENTED: ICD-10-PCS | Mod: CPTII,,, | Performed by: NURSE PRACTITIONER

## 2023-08-02 PROCEDURE — 99214 OFFICE O/P EST MOD 30 MIN: CPT | Mod: ,,, | Performed by: NURSE PRACTITIONER

## 2023-08-02 PROCEDURE — 1159F MED LIST DOCD IN RCRD: CPT | Mod: CPTII,,, | Performed by: NURSE PRACTITIONER

## 2023-08-02 PROCEDURE — 1160F RVW MEDS BY RX/DR IN RCRD: CPT | Mod: CPTII,,, | Performed by: NURSE PRACTITIONER

## 2023-08-02 PROCEDURE — 3078F PR MOST RECENT DIASTOLIC BLOOD PRESSURE < 80 MM HG: ICD-10-PCS | Mod: CPTII,,, | Performed by: NURSE PRACTITIONER

## 2023-08-02 PROCEDURE — 3075F PR MOST RECENT SYSTOLIC BLOOD PRESS GE 130-139MM HG: ICD-10-PCS | Mod: CPTII,,, | Performed by: NURSE PRACTITIONER

## 2023-08-02 RX ORDER — AMITRIPTYLINE HYDROCHLORIDE 150 MG/1
150 TABLET ORAL NIGHTLY
Qty: 30 TABLET | Refills: 1 | Status: SHIPPED | OUTPATIENT
Start: 2023-08-02 | End: 2023-09-19 | Stop reason: SDUPTHER

## 2023-08-02 RX ORDER — CLONAZEPAM 1 MG/1
1 TABLET ORAL DAILY
Qty: 15 TABLET | Refills: 1 | Status: SHIPPED | OUTPATIENT
Start: 2023-08-02 | End: 2023-09-19 | Stop reason: SDUPTHER

## 2023-08-02 RX ORDER — ARIPIPRAZOLE 10 MG/1
10 TABLET ORAL DAILY
Qty: 30 TABLET | Refills: 1 | Status: SHIPPED | OUTPATIENT
Start: 2023-08-02 | End: 2023-09-19 | Stop reason: SDUPTHER

## 2023-08-02 NOTE — PROGRESS NOTES
"PSYCHIATRIC FOLLOW-UP VISIT NOTE    Chief Complaint   Patient presents with    Mood     "I have been doing well. I have no complaints."         History of Present Illness  48 y.o. year old White female with hx of MDD, neeta, insomnia, and tobacco use seen today for follow-up appointment and medication management.  Patient reports she is been doing very well since last visit.  Denies any recent depression.  No mood lability or irritability reported.  Describes mood is happy most days of the week.  Spends much per free time baby-sitting her grandson and enjoys their time together.  Still some discord with her mother the patient has put up appropriate boundaries and is able to cope more effectively whenever her mother's dysfunctional behaviors occur.  When prolonged interaction with the mother is required this has been she uses her p.r.n. clonazepam.  States this usually occurs once or twice per week.  Good efficacy with the medication.  No suspected diversion or abuse.   checked.  She is sleeping well at night and feels rested in the morning.  Denies any side effects from current medication regimen.  No change in tobacco use and not motivated to quit at this time. Patient denies SI/HI. Denies hallucinations and does not appear to be responding to internal stimuli or be internally preoccupied. No manic symptoms noted. Tolerating SGA therapy without serious side effects. No NMS s/s. No EPS or TD symptoms noted. AIMS=0.        Objective:     Vitals:  Vitals:    08/02/23 0703   BP: 136/78   BP Location: Left arm   Patient Position: Sitting   Pulse: 94   Temp: 97.9 °F (36.6 °C)   TempSrc: Temporal   SpO2: 100%   Weight: 86.6 kg (191 lb)   Height: 5' (1.524 m)       Wt Readings from Last 3 Encounters:   08/02/23 0703 86.6 kg (191 lb)   05/30/23 0706 86.2 kg (190 lb)   04/03/23 0717 86.9 kg (191 lb 9.6 oz)         Medication:    Current Outpatient Medications:     gabapentin (NEURONTIN) 600 MG tablet, Take 600 mg by mouth " daily as needed., Disp: , Rfl:     pantoprazole (PROTONIX) 40 MG tablet, TAKE ONE(1) TAB BY MOUTH ONCE A DAY FOR STOMACH &/OR REFLUX /DO NOT CRUSH OR CHEW, Disp: 30 tablet, Rfl: 5    rOPINIRole (REQUIP) 0.5 MG tablet, Take 0.5 mg by mouth every evening., Disp: , Rfl:     topiramate (TOPAMAX) 100 MG tablet, TAKE ONE(1) TAB BY MOUTH TWICE DAILY WITH FULL GLASS OF WATER TO HELP PREVENT HEADACHE &/OR MOOD, Disp: 60 tablet, Rfl: 1    albuterol (PROVENTIL/VENTOLIN HFA) 90 mcg/actuation inhaler, Inhale 1 puff into the lungs 4 (four) times daily as needed., Disp: , Rfl:     amitriptyline (ELAVIL) 150 MG Tab, Take 1 tablet (150 mg total) by mouth every evening., Disp: 30 tablet, Rfl: 1    ARIPiprazole (ABILIFY) 10 MG Tab, Take 1 tablet (10 mg total) by mouth once daily., Disp: 30 tablet, Rfl: 1    clonazePAM (KLONOPIN) 1 MG tablet, Take 1 tablet (1 mg total) by mouth once daily., Disp: 15 tablet, Rfl: 1       Significant Labs: - none at this time    Significant Imaging: - none at this time    Physical Exam  Vitals and nursing note reviewed.   Constitutional:       General: She is awake.      Appearance: Normal appearance.   Musculoskeletal:      Comments: Full ROM   Neurological:      Mental Status: She is alert.   Psychiatric:         Attention and Perception: Attention and perception normal. She does not perceive auditory or visual hallucinations.         Mood and Affect: Affect normal.         Speech: Speech normal.         Behavior: Behavior is cooperative.         Thought Content: Thought content does not include homicidal or suicidal ideation.         Cognition and Memory: Cognition and memory normal.          Review of Systems     Mental Status Exam:  Presentation:  - Appearance: 48 y.o. year old White female, appears stated age, appears Casually dressed and Well groomed  - Motility: Erect when standing, Steady gait, No EPS or Tremors, No psychomotor agitation or retardation appreciated  - Behavior: calm, cooperative,  "good eye contact  Speech:  - Character/Organization: spontaneous, fluent, normal volume, normal rate, normal rhythm  Emotional State:  - Mood: "happy"   - Affect: congruent and appropriate  Thought:  - Process: logical, linear, organized , goal-directed  - Preoccupations: no ruminations, rituals, or phobias appreciated  - Delusions: no persecutory, paranoid, or grandiose delusions appreciated  - Perception: denies AVH, not actively responding to internal stimuli  - SI/HI: denies/denies  Sensorium & Intellect:  - Sensorium: AAOx4  - Memory: intact to recent and remote events  - Attention/Concentration: good/good  - Insight/Judgement: good/good    Gait: normal swing and stance  MSK:no rigidity appreciated    All other systems without acute issues unless noted in HPI      Assessment/Plan      ICD-10-CM ICD-9-CM    1. Moderate episode of recurrent major depressive disorder  F33.1 296.32 amitriptyline (ELAVIL) 150 MG Tab      ARIPiprazole (ABILIFY) 10 MG Tab      2. Generalized anxiety disorder  F41.1 300.02 clonazePAM (KLONOPIN) 1 MG tablet      3. Insomnia, unspecified type  G47.00 780.52       4. Tobacco user  Z72.0 305.1          Continue current medications without change    Potential side effects and risks vs benefits of current treatment plan reviewed with patient. Applicable black box warnings reviewed. Encouraged patient not to alter dosages or abruptly discontinue medications without contacting prescriber first, due to risk of worsening symptoms and decompensation of mental status. Warned of risks associated with herbal remedies and supplements while taking psychotropic medications and of the need to consult prescriber prior to adding any of these to current regimen. Patient should abstain from abuse of alcohol, prescription medications, and illicit drugs. Reviewed when to contact clinic and/or seek emergent care, such as but not limited to, onset/worsening SI/HI, hallucinations, delusions, manic symptoms. Pt " verbalized understanding and agreement of these warnings/recommendations and verbally consented to treatment plan.    Encouraged smoking cessation and reinforced negative effects of continued use. Assistance with smoking cessation was offered, including medications, counseling, printed information, and referral to smoking cessation program. Encouraged patient to visit [email or web address] for further information and resources.      Follow up in about 2 months (around 10/2/2023) for Medication Management.    PAULETTE VarelaP

## 2023-08-21 PROCEDURE — 83036 HEMOGLOBIN GLYCOSYLATED A1C: CPT | Performed by: NURSE PRACTITIONER

## 2023-08-21 PROCEDURE — 85025 COMPLETE CBC W/AUTO DIFF WBC: CPT | Performed by: NURSE PRACTITIONER

## 2023-08-21 PROCEDURE — 82306 VITAMIN D 25 HYDROXY: CPT | Performed by: NURSE PRACTITIONER

## 2023-08-21 PROCEDURE — 84443 ASSAY THYROID STIM HORMONE: CPT | Performed by: NURSE PRACTITIONER

## 2023-08-21 PROCEDURE — 80061 LIPID PANEL: CPT | Performed by: NURSE PRACTITIONER

## 2023-08-21 PROCEDURE — 80053 COMPREHEN METABOLIC PANEL: CPT | Performed by: NURSE PRACTITIONER

## 2023-08-23 DIAGNOSIS — G62.9 NEUROPATHY: ICD-10-CM

## 2023-08-23 DIAGNOSIS — G25.81 RESTLESS LEG: Primary | ICD-10-CM

## 2023-08-23 RX ORDER — ROPINIROLE 0.5 MG/1
TABLET, FILM COATED ORAL
Qty: 30 TABLET | Refills: 0 | Status: SHIPPED | OUTPATIENT
Start: 2023-08-23 | End: 2023-09-20

## 2023-08-23 RX ORDER — GABAPENTIN 600 MG/1
TABLET ORAL
Qty: 30 TABLET | Refills: 0 | Status: SHIPPED | OUTPATIENT
Start: 2023-08-23 | End: 2023-09-20

## 2023-08-28 ENCOUNTER — OFFICE VISIT (OUTPATIENT)
Dept: FAMILY MEDICINE | Facility: CLINIC | Age: 48
End: 2023-08-28
Payer: MEDICAID

## 2023-08-28 VITALS
HEART RATE: 94 BPM | BODY MASS INDEX: 37.5 KG/M2 | HEIGHT: 60 IN | DIASTOLIC BLOOD PRESSURE: 80 MMHG | SYSTOLIC BLOOD PRESSURE: 132 MMHG | WEIGHT: 191 LBS | OXYGEN SATURATION: 99 % | TEMPERATURE: 98 F

## 2023-08-28 DIAGNOSIS — G25.81 RESTLESS LEGS SYNDROME: ICD-10-CM

## 2023-08-28 DIAGNOSIS — Z00.00 ANNUAL PHYSICAL EXAM: Primary | ICD-10-CM

## 2023-08-28 DIAGNOSIS — Z86.39 H/O VITAMIN D DEFICIENCY: ICD-10-CM

## 2023-08-28 DIAGNOSIS — F41.1 GENERALIZED ANXIETY DISORDER: ICD-10-CM

## 2023-08-28 DIAGNOSIS — K21.9 GASTROESOPHAGEAL REFLUX DISEASE WITHOUT ESOPHAGITIS: ICD-10-CM

## 2023-08-28 DIAGNOSIS — Z72.0 TOBACCO USER: ICD-10-CM

## 2023-08-28 DIAGNOSIS — D50.8 IRON DEFICIENCY ANEMIA SECONDARY TO INADEQUATE DIETARY IRON INTAKE: ICD-10-CM

## 2023-08-28 DIAGNOSIS — M54.12 CHRONIC CERVICAL RADICULOPATHY: ICD-10-CM

## 2023-08-28 DIAGNOSIS — F32.A DEPRESSIVE DISORDER: ICD-10-CM

## 2023-08-28 DIAGNOSIS — E66.09 CLASS 2 OBESITY DUE TO EXCESS CALORIES WITHOUT SERIOUS COMORBIDITY WITH BODY MASS INDEX (BMI) OF 37.0 TO 37.9 IN ADULT: ICD-10-CM

## 2023-08-28 PROBLEM — Z83.3 FAMILY HISTORY OF DIABETES MELLITUS: Status: ACTIVE | Noted: 2023-08-28

## 2023-08-28 PROBLEM — N28.9 RENAL INSUFFICIENCY: Status: ACTIVE | Noted: 2023-08-28

## 2023-08-28 PROBLEM — D50.9 IRON DEFICIENCY ANEMIA: Status: ACTIVE | Noted: 2023-08-28

## 2023-08-28 PROBLEM — E66.812 CLASS 2 OBESITY DUE TO EXCESS CALORIES WITHOUT SERIOUS COMORBIDITY WITH BODY MASS INDEX (BMI) OF 37.0 TO 37.9 IN ADULT: Status: ACTIVE | Noted: 2023-08-28

## 2023-08-28 PROBLEM — Z86.19 HISTORY OF POSITIVE PCR FOR HERPES SIMPLEX VIRUS TYPE 1 (HSV-1) DNA: Status: ACTIVE | Noted: 2023-08-28

## 2023-08-28 PROBLEM — Z91.51 HISTORY OF SUICIDE ATTEMPT: Status: ACTIVE | Noted: 2023-08-28

## 2023-08-28 PROBLEM — E66.9 OBESITY: Status: ACTIVE | Noted: 2023-08-28

## 2023-08-28 PROCEDURE — 1159F PR MEDICATION LIST DOCUMENTED IN MEDICAL RECORD: ICD-10-PCS | Mod: CPTII,,, | Performed by: NURSE PRACTITIONER

## 2023-08-28 PROCEDURE — 1159F MED LIST DOCD IN RCRD: CPT | Mod: CPTII,,, | Performed by: NURSE PRACTITIONER

## 2023-08-28 PROCEDURE — 3008F BODY MASS INDEX DOCD: CPT | Mod: CPTII,,, | Performed by: NURSE PRACTITIONER

## 2023-08-28 PROCEDURE — 3008F PR BODY MASS INDEX (BMI) DOCUMENTED: ICD-10-PCS | Mod: CPTII,,, | Performed by: NURSE PRACTITIONER

## 2023-08-28 PROCEDURE — 3079F DIAST BP 80-89 MM HG: CPT | Mod: CPTII,,, | Performed by: NURSE PRACTITIONER

## 2023-08-28 PROCEDURE — 3044F PR MOST RECENT HEMOGLOBIN A1C LEVEL <7.0%: ICD-10-PCS | Mod: CPTII,,, | Performed by: NURSE PRACTITIONER

## 2023-08-28 PROCEDURE — 99396 PR PREVENTIVE VISIT,EST,40-64: ICD-10-PCS | Mod: ,,, | Performed by: NURSE PRACTITIONER

## 2023-08-28 PROCEDURE — 3079F PR MOST RECENT DIASTOLIC BLOOD PRESSURE 80-89 MM HG: ICD-10-PCS | Mod: CPTII,,, | Performed by: NURSE PRACTITIONER

## 2023-08-28 PROCEDURE — 3075F SYST BP GE 130 - 139MM HG: CPT | Mod: CPTII,,, | Performed by: NURSE PRACTITIONER

## 2023-08-28 PROCEDURE — 3075F PR MOST RECENT SYSTOLIC BLOOD PRESS GE 130-139MM HG: ICD-10-PCS | Mod: CPTII,,, | Performed by: NURSE PRACTITIONER

## 2023-08-28 PROCEDURE — 1160F PR REVIEW ALL MEDS BY PRESCRIBER/CLIN PHARMACIST DOCUMENTED: ICD-10-PCS | Mod: CPTII,,, | Performed by: NURSE PRACTITIONER

## 2023-08-28 PROCEDURE — 1160F RVW MEDS BY RX/DR IN RCRD: CPT | Mod: CPTII,,, | Performed by: NURSE PRACTITIONER

## 2023-08-28 PROCEDURE — 99396 PREV VISIT EST AGE 40-64: CPT | Mod: ,,, | Performed by: NURSE PRACTITIONER

## 2023-08-28 PROCEDURE — 3044F HG A1C LEVEL LT 7.0%: CPT | Mod: CPTII,,, | Performed by: NURSE PRACTITIONER

## 2023-08-28 RX ORDER — FERROUS SULFATE 325(65) MG
325 TABLET ORAL
Qty: 45 TABLET | Refills: 3 | Status: SHIPPED | OUTPATIENT
Start: 2023-08-28 | End: 2023-08-28 | Stop reason: SDUPTHER

## 2023-08-28 RX ORDER — FERROUS SULFATE 325(65) MG
325 TABLET ORAL EVERY OTHER DAY
Qty: 45 TABLET | Refills: 3 | Status: SHIPPED | OUTPATIENT
Start: 2023-08-28

## 2023-08-28 NOTE — ASSESSMENT & PLAN NOTE
Take medication: as needed  Preventive Measures:   Avoid spicy, acidic, fried foods and alcohol.  Eat 2-3 hours before going to bed. Remain upright for 30-60 minutes after eating.   Avoid tight clothing, chew food thoroughly.  Reduce caffeine intake, avoid soda.  Stressed importance of Smoking/Tobacco Cessation.

## 2023-08-28 NOTE — PROGRESS NOTES
Patient ID: Kelly Smith  : 1975    Chief Complaint: Annual Exam    Allergies: Patient is allergic to sertraline.     History of Present Illness:  The patient is a 48 y.o. White female who presents to clinic for annual wellness visit.      Here for wellness. She is feeling well in general. She is however, unhappy with her current weight. She feels that she is eating healthy and is working out 4 times weekly but cannot seem to lose anything.     She has hx of GUSTABO. Chewing on ice throughout the visit and reports that she has been doing this over the last couple months. She denies other symptoms of anemia. Denies bleeding, no melena. She denies heavy menstruation. She was on iron supplementation previously but has not been taking that in a while.     Denies changes in bowel patterns, no melena, hematochezia, rectal pain, rectal bleeding, or changes in caliber of stool. No family hx colon cancer.     Established with mental health provider.         Past Medical History:  has no past medical history on file.    Surgical History:  has a past surgical history that includes Appendectomy and  section.    Family History: family history includes Anxiety disorder in her mother; Depression in her mother; No Known Problems in her brother, cousin, father, maternal aunt, maternal grandfather, maternal grandmother, maternal uncle, paternal aunt, paternal grandfather, paternal grandmother, paternal uncle, sister, and another family member.    Social History:  reports that she has been smoking cigarettes. She has been exposed to tobacco smoke. She has never used smokeless tobacco. She reports that she does not currently use alcohol. She reports that she does not currently use drugs.    Care Team: Patient Care Team:  Ayde Stanford FNP-C as PCP - General (Family Medicine)  Krysten Hess NP as Nurse Practitioner (Obstetrics and Gynecology)  Maynor Charles PMHNP as Nurse Practitioner  (Psychiatry)     Current Medications:  Current Outpatient Medications   Medication Instructions    albuterol (PROVENTIL/VENTOLIN HFA) 90 mcg/actuation inhaler 1 puff, Inhalation, 4 times daily PRN    amitriptyline (ELAVIL) 150 mg, Oral, Nightly    ARIPiprazole (ABILIFY) 10 mg, Oral, Daily    clonazePAM (KLONOPIN) 1 mg, Oral, Daily    ferrous sulfate (FEOSOL) 325 mg, Oral, Every other day    gabapentin (NEURONTIN) 600 MG tablet TAKE ONE(1) TAB BY MOUTH ONCE A DAY WITH FOOD AS NEEDED FOR PAIN    pantoprazole (PROTONIX) 40 MG tablet TAKE ONE(1) TAB BY MOUTH ONCE A DAY FOR STOMACH &/OR REFLUX /DO NOT CRUSH OR CHEW    rOPINIRole (REQUIP) 0.5 MG tablet TAKE ONE(1) TAB BY MOUTH EVERY NIGHT AT BEDTIME /TAKE 1-3 HOURS BEFORE BEDTIME    topiramate (TOPAMAX) 100 MG tablet TAKE ONE(1) TAB BY MOUTH TWICE DAILY WITH FULL GLASS OF WATER TO HELP PREVENT HEADACHE &/OR MOOD       Review of Systems   Constitutional:  Negative for activity change, appetite change, fatigue and unexpected weight change.   HENT:  Negative for ear pain and hearing loss.    Eyes:  Negative for visual disturbance.   Respiratory:  Negative for apnea, cough, chest tightness, shortness of breath and wheezing.    Cardiovascular:  Negative for chest pain, palpitations, leg swelling and claudication.   Gastrointestinal:  Negative for abdominal pain, anal bleeding, blood in stool, change in bowel habit, nausea, vomiting, reflux and change in bowel habit.   Endocrine: Negative for cold intolerance, heat intolerance, polydipsia, polyphagia and polyuria.   Genitourinary:  Negative for dysuria, frequency, hematuria and urgency.   Musculoskeletal:  Negative for arthralgias.   Integumentary:  Negative for rash and mole/lesion.   Allergic/Immunologic: Negative for environmental allergies.   Neurological:  Negative for dizziness, headaches and memory loss.          Visit Vitals  /80 (BP Location: Left arm)   Pulse 94   Temp 98.2 °F (36.8 °C) (Temporal)   Ht 5' (1.524  m)   Wt 86.6 kg (191 lb)   LMP 08/09/2023   SpO2 99%   BMI 37.30 kg/m²       Physical Exam  Vitals reviewed.   Constitutional:       Appearance: Normal appearance. She is normal weight.   HENT:      Right Ear: Tympanic membrane, ear canal and external ear normal.      Left Ear: Tympanic membrane, ear canal and external ear normal.      Nose: Nose normal.      Mouth/Throat:      Mouth: Mucous membranes are moist.      Pharynx: Oropharynx is clear.   Eyes:      Conjunctiva/sclera: Conjunctivae normal.   Cardiovascular:      Rate and Rhythm: Normal rate and regular rhythm.      Pulses: Normal pulses.      Heart sounds: Normal heart sounds.   Pulmonary:      Effort: Pulmonary effort is normal.      Breath sounds: Normal breath sounds.   Abdominal:      General: Bowel sounds are normal.      Palpations: Abdomen is soft.   Musculoskeletal:         General: Normal range of motion.      Cervical back: Normal range of motion and neck supple.   Skin:     General: Skin is warm and dry.      Capillary Refill: Capillary refill takes less than 2 seconds.      Coloration: Skin is pale.   Neurological:      Mental Status: She is alert and oriented to person, place, and time.   Psychiatric:         Mood and Affect: Mood normal.         Behavior: Behavior normal.          Labs Reviewed:  Chemistry:  Lab Results   Component Value Date     08/21/2023    K 4.3 08/21/2023    CHLORIDE 106 08/21/2023    BUN 9.0 08/21/2023    CREATININE 0.94 08/21/2023    EGFRNORACEVR 76 08/21/2023    GLUCOSE 87 08/21/2023    CALCIUM 9.1 08/21/2023    ALKPHOS 83 08/21/2023    LABPROT 6.9 08/21/2023    ALBUMIN 4.2 08/21/2023    AST 27 08/21/2023    ALT 21 08/21/2023    LSJJTBIT43FD 31.8 08/21/2023    TSH 2.540 08/21/2023        Lab Results   Component Value Date    HGBA1C 5.4 08/21/2023        Hematology:  Lab Results   Component Value Date    WBC 9.01 08/21/2023    RBC 4.05 08/21/2023    HGB 9.0 (L) 08/21/2023    HCT 29.9 (L) 08/21/2023    MCV 73.8  (L) 08/21/2023    MCH 22.2 (L) 08/21/2023    MCHC 30.1 (L) 08/21/2023    RDW 17.0 (H) 08/21/2023     (H) 08/21/2023    MPV 8.5 (L) 08/21/2023       Lipid Panel:  Lab Results   Component Value Date    CHOL 224 (H) 08/21/2023    HDL 47 08/21/2023    DLDL 128.6 (H) 08/21/2023    TRIG 159 08/21/2023        Assessment & Plan:  1. Annual physical exam  -     Mammo Digital Screening Bilat w/ Fidel; Future; Expected date: 08/28/2023  -     Cologuard Screening (Multitarget Stool DNA); Future; Expected date: 08/28/2023    2. Chronic cervical radiculopathy  Overview:  Hx C-spine fusion  On Gabapentin 600 Mg BID      3. Gastroesophageal reflux disease without esophagitis  Overview:  On Protonix 40 mg daily as needed.     Assessment & Plan:  Take medication: as needed  Preventive Measures:   Avoid spicy, acidic, fried foods and alcohol.  Eat 2-3 hours before going to bed. Remain upright for 30-60 minutes after eating.   Avoid tight clothing, chew food thoroughly.  Reduce caffeine intake, avoid soda.  Stressed importance of Smoking/Tobacco Cessation.      4. Iron deficiency anemia secondary to inadequate dietary iron intake  -     Discontinue: ferrous sulfate (FEOSOL) 325 mg (65 mg iron) Tab tablet; Take 1 tablet (325 mg total) by mouth daily with breakfast.  Dispense: 45 tablet; Refill: 3  -     ferrous sulfate (FEOSOL) 325 mg (65 mg iron) Tab tablet; Take 1 tablet (325 mg total) by mouth every other day.  Dispense: 45 tablet; Refill: 3  -     CBC Auto Differential; Future; Expected date: 09/28/2023    5. Restless legs syndrome  Overview:  On Ropinirole 0.5 mg Qhs      6. H/O vitamin D deficiency  Overview:  Advised to continue OTS Vitamin D supplementation      7. Depressive disorder  Overview:  On Abilify 10 mg daily and Amitriptyline 150 mg Qhs      8. Generalized anxiety disorder  Overview:  Established with mental health provider.  On Klonopin 1 mg daily.       9. Tobacco user  Overview:  16 pack year hx    Assessment  & Plan:  Encouraged smoking cessation.       10. Class 2 obesity due to excess calories without serious comorbidity with body mass index (BMI) of 37.0 to 37.9 in adult  Assessment & Plan:  Body mass index is 37.3 kg/m².  Goal BMI <30.  Exercise 5 times a week for 30 minutes per day.  Avoid soda, simple sugars, excessive rice, potatoes or bread. Limit fast foods and fried foods.  Choose complex carbs in moderation (example: green vegetables, beans, oatmeal). Eat plenty of fresh fruits and vegetables with lean meats daily.  Do not skip meals. Eat a balanced portion size.  Avoid fad diets. Consider permanent healthy life style changes.              Cervical Cancer Screening- appt scheduled  Breast Cancer Screening- Mammo ordered  Colon Cancer Screening -  refused referral for colonoscopy; willint to do [place]  Eye Exam- Recommend annually.  Dental Exam- Recommend biannually.    Vaccinations:  Immunization History   Administered Date(s) Administered    COVID-19, MRNA, LN-S, PF (MODERNA FULL 0.5 ML DOSE) 12/31/2021, 12/31/2021, 01/28/2022, 01/28/2022    DTaP [birth year], 01/05/1978, 11/23/1979, 04/29/1981, 04/10/1991    Influenza - Quadrivalent - PF *Preferred* (6 months and older) 10/01/2009    Influenza - Trivalent - PF (ADULT) 10/01/2009, 11/06/2014    MMR 08/05/1978    Td - PF (ADULT) 03/20/2005       Future Appointments   Date Time Provider Department Center   10/3/2023  7:00 AM Maynor Charles, PMHNP [facility]       Follow up in about 1 year (around 8/28/2024) for wellness, labs prior; ALSO, need repeat CBC/lab in 6 weeks . Call sooner if needed.    JASE SamayoaC    Lab Frequency Next Occurrence   Ambulatory referral/consult to Smoking Cessation Program Once 04/10/2023

## 2023-08-28 NOTE — ASSESSMENT & PLAN NOTE

## 2023-09-19 ENCOUNTER — OFFICE VISIT (OUTPATIENT)
Dept: BEHAVIORAL HEALTH | Facility: CLINIC | Age: 48
End: 2023-09-19
Payer: MEDICAID

## 2023-09-19 VITALS
HEART RATE: 86 BPM | TEMPERATURE: 97 F | OXYGEN SATURATION: 99 % | SYSTOLIC BLOOD PRESSURE: 104 MMHG | WEIGHT: 191.56 LBS | DIASTOLIC BLOOD PRESSURE: 60 MMHG | HEIGHT: 60 IN | BODY MASS INDEX: 37.61 KG/M2

## 2023-09-19 DIAGNOSIS — Z72.0 TOBACCO USER: ICD-10-CM

## 2023-09-19 DIAGNOSIS — F33.1 MODERATE EPISODE OF RECURRENT MAJOR DEPRESSIVE DISORDER: Primary | ICD-10-CM

## 2023-09-19 DIAGNOSIS — F41.1 GENERALIZED ANXIETY DISORDER: ICD-10-CM

## 2023-09-19 DIAGNOSIS — G47.00 INSOMNIA, UNSPECIFIED TYPE: ICD-10-CM

## 2023-09-19 PROCEDURE — 1160F PR REVIEW ALL MEDS BY PRESCRIBER/CLIN PHARMACIST DOCUMENTED: ICD-10-PCS | Mod: CPTII,,, | Performed by: NURSE PRACTITIONER

## 2023-09-19 PROCEDURE — 3074F SYST BP LT 130 MM HG: CPT | Mod: CPTII,,, | Performed by: NURSE PRACTITIONER

## 2023-09-19 PROCEDURE — 3008F BODY MASS INDEX DOCD: CPT | Mod: CPTII,,, | Performed by: NURSE PRACTITIONER

## 2023-09-19 PROCEDURE — 3074F PR MOST RECENT SYSTOLIC BLOOD PRESSURE < 130 MM HG: ICD-10-PCS | Mod: CPTII,,, | Performed by: NURSE PRACTITIONER

## 2023-09-19 PROCEDURE — 1159F PR MEDICATION LIST DOCUMENTED IN MEDICAL RECORD: ICD-10-PCS | Mod: CPTII,,, | Performed by: NURSE PRACTITIONER

## 2023-09-19 PROCEDURE — 3044F HG A1C LEVEL LT 7.0%: CPT | Mod: CPTII,,, | Performed by: NURSE PRACTITIONER

## 2023-09-19 PROCEDURE — 1159F MED LIST DOCD IN RCRD: CPT | Mod: CPTII,,, | Performed by: NURSE PRACTITIONER

## 2023-09-19 PROCEDURE — 99214 OFFICE O/P EST MOD 30 MIN: CPT | Mod: ,,, | Performed by: NURSE PRACTITIONER

## 2023-09-19 PROCEDURE — 3044F PR MOST RECENT HEMOGLOBIN A1C LEVEL <7.0%: ICD-10-PCS | Mod: CPTII,,, | Performed by: NURSE PRACTITIONER

## 2023-09-19 PROCEDURE — 3078F PR MOST RECENT DIASTOLIC BLOOD PRESSURE < 80 MM HG: ICD-10-PCS | Mod: CPTII,,, | Performed by: NURSE PRACTITIONER

## 2023-09-19 PROCEDURE — 3008F PR BODY MASS INDEX (BMI) DOCUMENTED: ICD-10-PCS | Mod: CPTII,,, | Performed by: NURSE PRACTITIONER

## 2023-09-19 PROCEDURE — 1160F RVW MEDS BY RX/DR IN RCRD: CPT | Mod: CPTII,,, | Performed by: NURSE PRACTITIONER

## 2023-09-19 PROCEDURE — 3078F DIAST BP <80 MM HG: CPT | Mod: CPTII,,, | Performed by: NURSE PRACTITIONER

## 2023-09-19 PROCEDURE — 99214 PR OFFICE/OUTPT VISIT, EST, LEVL IV, 30-39 MIN: ICD-10-PCS | Mod: ,,, | Performed by: NURSE PRACTITIONER

## 2023-09-19 RX ORDER — ARIPIPRAZOLE 10 MG/1
10 TABLET ORAL DAILY
Qty: 30 TABLET | Refills: 1 | Status: SHIPPED | OUTPATIENT
Start: 2023-09-19 | End: 2023-11-20 | Stop reason: SDUPTHER

## 2023-09-19 RX ORDER — AMITRIPTYLINE HYDROCHLORIDE 150 MG/1
150 TABLET ORAL NIGHTLY
Qty: 30 TABLET | Refills: 1 | Status: SHIPPED | OUTPATIENT
Start: 2023-09-19 | End: 2023-11-20 | Stop reason: SDUPTHER

## 2023-09-19 RX ORDER — CLONAZEPAM 1 MG/1
1 TABLET ORAL DAILY
Qty: 15 TABLET | Refills: 1 | Status: SHIPPED | OUTPATIENT
Start: 2023-09-19 | End: 2023-11-20 | Stop reason: SDUPTHER

## 2023-09-19 NOTE — PROGRESS NOTES
PSYCHIATRIC FOLLOW-UP VISIT NOTE    Chief Complaint   Patient presents with    Depression     2 month F/u         History of Present Illness  48 y.o. year old White female with hx of MDD, OLIMPIA, insomnia, and tobacco use seen today for follow-up appointment and medication management.  Patient reports she is been doing well overall since her last visit.  Mood is mostly been good and she describes herself as happy and content most days of the week.  Has continued to set healthy boundaries with toxic family members, particularly her mother.  Has been able to maintain those and feels it has been beneficial for her mental health.  Also states that she and her daughter have moved more into immature and adult relationships since her daughter had patient's 1st grandchild.  Feels that relationship is better than ever.  She is sleeping well at night and feels rested in the morning.  She is had some increased stressors in the past 2 weeks including her disabled sister having a bowel perforation requiring intensive care and the death of a cousin 3 days ago in a car accident.  Feels she is coping with these effectively at present. Patient denies SI/HI. Denies hallucinations and does not appear to be responding to internal stimuli or be internally preoccupied. No manic symptoms noted. Tolerating SGA therapy without serious side effects. No NMS s/s. No EPS or TD symptoms noted. AIMS=0.            Objective:     Vitals:  Vitals:    09/19/23 0706   BP: 104/60   BP Location: Right arm   Patient Position: Sitting   BP Method: Large (Manual)   Pulse: 86   Temp: 97.3 °F (36.3 °C)   TempSrc: Temporal   SpO2: 99%   Weight: 86.9 kg (191 lb 9.3 oz)   Height: 5' (1.524 m)       Wt Readings from Last 3 Encounters:   09/19/23 0706 86.9 kg (191 lb 9.3 oz)   08/28/23 0829 86.6 kg (191 lb)   08/02/23 0703 86.6 kg (191 lb)         Medication:    Current Outpatient Medications:     albuterol (PROVENTIL/VENTOLIN HFA) 90 mcg/actuation inhaler, Inhale  1 puff into the lungs 4 (four) times daily as needed., Disp: , Rfl:     ferrous sulfate (FEOSOL) 325 mg (65 mg iron) Tab tablet, Take 1 tablet (325 mg total) by mouth every other day., Disp: 45 tablet, Rfl: 3    gabapentin (NEURONTIN) 600 MG tablet, TAKE ONE(1) TAB BY MOUTH ONCE A DAY WITH FOOD AS NEEDED FOR PAIN, Disp: 30 tablet, Rfl: 0    pantoprazole (PROTONIX) 40 MG tablet, TAKE ONE(1) TAB BY MOUTH ONCE A DAY FOR STOMACH &/OR REFLUX /DO NOT CRUSH OR CHEW, Disp: 30 tablet, Rfl: 5    rOPINIRole (REQUIP) 0.5 MG tablet, TAKE ONE(1) TAB BY MOUTH EVERY NIGHT AT BEDTIME /TAKE 1-3 HOURS BEFORE BEDTIME, Disp: 30 tablet, Rfl: 0    topiramate (TOPAMAX) 100 MG tablet, TAKE ONE(1) TAB BY MOUTH TWICE DAILY WITH FULL GLASS OF WATER TO HELP PREVENT HEADACHE &/OR MOOD, Disp: 60 tablet, Rfl: 1    amitriptyline (ELAVIL) 150 MG Tab, Take 1 tablet (150 mg total) by mouth every evening., Disp: 30 tablet, Rfl: 1    ARIPiprazole (ABILIFY) 10 MG Tab, Take 1 tablet (10 mg total) by mouth once daily., Disp: 30 tablet, Rfl: 1    clonazePAM (KLONOPIN) 1 MG tablet, Take 1 tablet (1 mg total) by mouth once daily., Disp: 15 tablet, Rfl: 1       Significant Labs: - none at this time    Significant Imaging: - none at this time    Physical Exam  Vitals and nursing note reviewed.   Constitutional:       General: She is awake.      Appearance: Normal appearance.   Musculoskeletal:      Comments: Full ROM   Neurological:      Mental Status: She is alert.   Psychiatric:         Attention and Perception: Attention and perception normal. She does not perceive auditory or visual hallucinations.         Mood and Affect: Affect normal.         Speech: Speech normal.         Behavior: Behavior is cooperative.         Thought Content: Thought content does not include homicidal or suicidal ideation.         Cognition and Memory: Cognition and memory normal.          Review of Systems     Mental Status Exam:  Presentation:  - Appearance: 48 y.o. year old  "White female, appears stated age, appears Casually dressed and Well groomed  - Motility: Erect when standing, Steady gait, No EPS or Tremors, No psychomotor agitation or retardation appreciated  - Behavior: calm, cooperative, good eye contact  Speech:  - Character/Organization: spontaneous, fluent, normal volume, normal rate, normal rhythm  Emotional State:  - Mood: "happy"   - Affect: congruent and appropriate  Thought:  - Process: logical, linear, organized , goal-directed  - Preoccupations: no ruminations, rituals, or phobias appreciated  - Delusions: no persecutory, paranoid, or grandiose delusions appreciated  - Perception: denies AVH, not actively responding to internal stimuli  - SI/HI: denies/denies  Sensorium & Intellect:  - Sensorium: AAOx4  - Memory: intact to recent and remote events  - Attention/Concentration: good/good  - Insight/Judgement: good/good    Gait: normal swing and stance  MSK:no rigidity appreciated    All other systems without acute issues unless noted in HPI      Assessment/Plan      ICD-10-CM ICD-9-CM    1. Moderate episode of recurrent major depressive disorder  F33.1 296.32 amitriptyline (ELAVIL) 150 MG Tab      ARIPiprazole (ABILIFY) 10 MG Tab      2. Generalized anxiety disorder  F41.1 300.02 clonazePAM (KLONOPIN) 1 MG tablet      3. Insomnia, unspecified type  G47.00 780.52       4. Tobacco user  Z72.0 305.1          Continue current medications without change    Potential side effects and risks vs benefits of current treatment plan reviewed with patient. Applicable black box warnings reviewed. Encouraged patient not to alter dosages or abruptly discontinue medications without contacting prescriber first, due to risk of worsening symptoms and decompensation of mental status. Warned of risks associated with herbal remedies and supplements while taking psychotropic medications and of the need to consult prescriber prior to adding any of these to current regimen. Patient should abstain " from abuse of alcohol, prescription medications, and illicit drugs. Reviewed when to contact clinic and/or seek emergent care, such as but not limited to, onset/worsening SI/HI, hallucinations, delusions, manic symptoms. Pt verbalized understanding and agreement of these warnings/recommendations and verbally consented to treatment plan.    Reviewed non-pharmacologic coping skills to decrease anxiety, such as deep breathing, journaling, exercise, recognizing triggers, meditation, healthy diet and exercise, routine sleep schedule, negative thought recognition, time for hobbies/interests, relaxation techniques, avoidance of substance abuse, limiting caffeine, benefits of counseling, and biofeedback. Patient verbalized understanding.    Encouraged smoking cessation and reinforced negative effects of continued use. Assistance with smoking cessation was offered, including medications, counseling, printed information, and referral to smoking cessation program. Encouraged patient to visit [email or web address] for further information and resources.     checked. Results as expected. No suspected diversion or abuse of controlled substances.      Follow up in about 2 months (around 11/19/2023) for Medication Management.    Maynor Charles, DANIKA

## 2023-09-20 DIAGNOSIS — K21.9 GASTROESOPHAGEAL REFLUX DISEASE, UNSPECIFIED WHETHER ESOPHAGITIS PRESENT: ICD-10-CM

## 2023-09-20 DIAGNOSIS — G25.81 RESTLESS LEG: ICD-10-CM

## 2023-09-20 DIAGNOSIS — G62.9 NEUROPATHY: ICD-10-CM

## 2023-09-20 RX ORDER — ROPINIROLE 0.5 MG/1
TABLET, FILM COATED ORAL
Qty: 30 TABLET | Refills: 0 | Status: SHIPPED | OUTPATIENT
Start: 2023-09-20 | End: 2023-10-20

## 2023-09-20 RX ORDER — PANTOPRAZOLE SODIUM 40 MG/1
TABLET, DELAYED RELEASE ORAL
Qty: 30 TABLET | Refills: 5 | Status: SHIPPED | OUTPATIENT
Start: 2023-09-20 | End: 2024-04-01

## 2023-09-20 RX ORDER — GABAPENTIN 600 MG/1
TABLET ORAL
Qty: 30 TABLET | Refills: 0 | Status: SHIPPED | OUTPATIENT
Start: 2023-09-20 | End: 2023-10-20

## 2023-09-23 LAB — NONINV COLON CA DNA+OCC BLD SCRN STL QL: NEGATIVE

## 2023-10-20 DIAGNOSIS — G25.81 RESTLESS LEG: ICD-10-CM

## 2023-10-20 DIAGNOSIS — G62.9 NEUROPATHY: ICD-10-CM

## 2023-10-20 RX ORDER — ROPINIROLE 0.5 MG/1
TABLET, FILM COATED ORAL
Qty: 30 TABLET | Refills: 0 | Status: SHIPPED | OUTPATIENT
Start: 2023-10-20 | End: 2023-11-20

## 2023-10-20 RX ORDER — GABAPENTIN 600 MG/1
TABLET ORAL
Qty: 30 TABLET | Refills: 0 | Status: SHIPPED | OUTPATIENT
Start: 2023-10-20 | End: 2023-11-20

## 2023-11-20 ENCOUNTER — TELEPHONE (OUTPATIENT)
Dept: FAMILY MEDICINE | Facility: CLINIC | Age: 48
End: 2023-11-20
Payer: MEDICAID

## 2023-11-20 ENCOUNTER — OFFICE VISIT (OUTPATIENT)
Dept: BEHAVIORAL HEALTH | Facility: CLINIC | Age: 48
End: 2023-11-20
Payer: MEDICAID

## 2023-11-20 VITALS
DIASTOLIC BLOOD PRESSURE: 72 MMHG | HEIGHT: 60 IN | HEART RATE: 92 BPM | BODY MASS INDEX: 37.13 KG/M2 | TEMPERATURE: 98 F | WEIGHT: 189.13 LBS | OXYGEN SATURATION: 100 % | SYSTOLIC BLOOD PRESSURE: 126 MMHG

## 2023-11-20 DIAGNOSIS — G62.9 NEUROPATHY: ICD-10-CM

## 2023-11-20 DIAGNOSIS — F33.1 MODERATE EPISODE OF RECURRENT MAJOR DEPRESSIVE DISORDER: Primary | ICD-10-CM

## 2023-11-20 DIAGNOSIS — G25.81 RESTLESS LEG: ICD-10-CM

## 2023-11-20 DIAGNOSIS — G47.00 INSOMNIA, UNSPECIFIED TYPE: ICD-10-CM

## 2023-11-20 DIAGNOSIS — F41.1 GENERALIZED ANXIETY DISORDER: ICD-10-CM

## 2023-11-20 DIAGNOSIS — Z72.0 TOBACCO USER: ICD-10-CM

## 2023-11-20 PROCEDURE — 3008F BODY MASS INDEX DOCD: CPT | Mod: CPTII,,, | Performed by: NURSE PRACTITIONER

## 2023-11-20 PROCEDURE — 99214 PR OFFICE/OUTPT VISIT, EST, LEVL IV, 30-39 MIN: ICD-10-PCS | Mod: ,,, | Performed by: NURSE PRACTITIONER

## 2023-11-20 PROCEDURE — 1160F PR REVIEW ALL MEDS BY PRESCRIBER/CLIN PHARMACIST DOCUMENTED: ICD-10-PCS | Mod: CPTII,,, | Performed by: NURSE PRACTITIONER

## 2023-11-20 PROCEDURE — 3044F HG A1C LEVEL LT 7.0%: CPT | Mod: CPTII,,, | Performed by: NURSE PRACTITIONER

## 2023-11-20 PROCEDURE — 3074F PR MOST RECENT SYSTOLIC BLOOD PRESSURE < 130 MM HG: ICD-10-PCS | Mod: CPTII,,, | Performed by: NURSE PRACTITIONER

## 2023-11-20 PROCEDURE — 1159F MED LIST DOCD IN RCRD: CPT | Mod: CPTII,,, | Performed by: NURSE PRACTITIONER

## 2023-11-20 PROCEDURE — 3074F SYST BP LT 130 MM HG: CPT | Mod: CPTII,,, | Performed by: NURSE PRACTITIONER

## 2023-11-20 PROCEDURE — 3078F DIAST BP <80 MM HG: CPT | Mod: CPTII,,, | Performed by: NURSE PRACTITIONER

## 2023-11-20 PROCEDURE — 99214 OFFICE O/P EST MOD 30 MIN: CPT | Mod: ,,, | Performed by: NURSE PRACTITIONER

## 2023-11-20 PROCEDURE — 1160F RVW MEDS BY RX/DR IN RCRD: CPT | Mod: CPTII,,, | Performed by: NURSE PRACTITIONER

## 2023-11-20 PROCEDURE — 3008F PR BODY MASS INDEX (BMI) DOCUMENTED: ICD-10-PCS | Mod: CPTII,,, | Performed by: NURSE PRACTITIONER

## 2023-11-20 PROCEDURE — 3078F PR MOST RECENT DIASTOLIC BLOOD PRESSURE < 80 MM HG: ICD-10-PCS | Mod: CPTII,,, | Performed by: NURSE PRACTITIONER

## 2023-11-20 PROCEDURE — 3044F PR MOST RECENT HEMOGLOBIN A1C LEVEL <7.0%: ICD-10-PCS | Mod: CPTII,,, | Performed by: NURSE PRACTITIONER

## 2023-11-20 PROCEDURE — 1159F PR MEDICATION LIST DOCUMENTED IN MEDICAL RECORD: ICD-10-PCS | Mod: CPTII,,, | Performed by: NURSE PRACTITIONER

## 2023-11-20 RX ORDER — ROPINIROLE 0.5 MG/1
TABLET, FILM COATED ORAL
Qty: 30 TABLET | Refills: 2 | Status: SHIPPED | OUTPATIENT
Start: 2023-11-20 | End: 2024-01-19

## 2023-11-20 RX ORDER — CLONAZEPAM 1 MG/1
1 TABLET ORAL DAILY
Qty: 15 TABLET | Refills: 1 | Status: SHIPPED | OUTPATIENT
Start: 2023-11-20 | End: 2024-01-19 | Stop reason: SDUPTHER

## 2023-11-20 RX ORDER — ARIPIPRAZOLE 10 MG/1
10 TABLET ORAL DAILY
Qty: 30 TABLET | Refills: 1 | Status: SHIPPED | OUTPATIENT
Start: 2023-11-20 | End: 2024-01-19 | Stop reason: SDUPTHER

## 2023-11-20 RX ORDER — AMITRIPTYLINE HYDROCHLORIDE 150 MG/1
150 TABLET ORAL NIGHTLY
Qty: 30 TABLET | Refills: 1 | Status: SHIPPED | OUTPATIENT
Start: 2023-11-20 | End: 2024-01-19 | Stop reason: SDUPTHER

## 2023-11-20 RX ORDER — GABAPENTIN 600 MG/1
TABLET ORAL
Qty: 30 TABLET | Refills: 2 | Status: SHIPPED | OUTPATIENT
Start: 2023-11-20 | End: 2024-01-19

## 2023-11-20 NOTE — PROGRESS NOTES
PSYCHIATRIC FOLLOW-UP VISIT NOTE    Chief Complaint   Patient presents with    Depression     2 month F/U         History of Present Illness  48 y.o. year old White female with hx of MDD, OLIMPIA, insomnia, and tobacco use seen today for follow-up appointment and medication management.  Patient reports that she is been doing quite well since last visit.  Denies any recent increase in depression.  No anhedonia, isolative behaviors, appetite changes, feelings of guilt and shame or hopelessness or helplessness.  Anxiety also well-controlled with current medications.  Whenever her symptoms to become moderate to severe there with her as needed clonazepam.  No suspected abuse or diversion.  She was excited for the holidays and spending time with the family and grandchild.  States she and her mother are getting along well recently.  Mother is respecting her boundaries.  She is sleeping well at night and feels rested in the morning.  Denies any side effects from current medication regimen. Patient denies SI/HI. Denies hallucinations and does not appear to be responding to internal stimuli or be internally preoccupied. No manic symptoms noted. Tolerating SGA therapy without serious side effects. No NMS s/s. No EPS or TD symptoms noted. AIMS=0.        Objective:     Vitals:  Vitals:    11/20/23 0704   BP: 126/72   BP Location: Right arm   Patient Position: Sitting   BP Method: Large (Manual)   Pulse: 92   Temp: 97.5 °F (36.4 °C)   TempSrc: Temporal   SpO2: 100%   Weight: 85.8 kg (189 lb 2.5 oz)   Height: 5' (1.524 m)       Wt Readings from Last 3 Encounters:   11/20/23 0704 85.8 kg (189 lb 2.5 oz)   09/19/23 0706 86.9 kg (191 lb 9.3 oz)   08/28/23 0829 86.6 kg (191 lb)         Medication:    Current Outpatient Medications:     albuterol (PROVENTIL/VENTOLIN HFA) 90 mcg/actuation inhaler, Inhale 1 puff into the lungs 4 (four) times daily as needed., Disp: , Rfl:     ferrous sulfate (FEOSOL) 325 mg (65 mg iron) Tab tablet, Take 1  tablet (325 mg total) by mouth every other day., Disp: 45 tablet, Rfl: 3    gabapentin (NEURONTIN) 600 MG tablet, TAKE ONE(1) TAB BY MOUTH ONCE A DAY WITH FOOD AS NEEDED FOR PAIN, Disp: 30 tablet, Rfl: 0    pantoprazole (PROTONIX) 40 MG tablet, TAKE ONE(1) TAB BY MOUTH ONCE A DAY FOR STOMACH &/OR REFLUX /DO NOT CRUSH OR CHEW, Disp: 30 tablet, Rfl: 5    rOPINIRole (REQUIP) 0.5 MG tablet, TAKE ONE(1) TAB BY MOUTH EVERY NIGHT AT BEDTIME /TAKE 1-3 HOURS BEFORE BEDTIME, Disp: 30 tablet, Rfl: 0    topiramate (TOPAMAX) 100 MG tablet, TAKE ONE(1) TAB BY MOUTH TWICE DAILY WITH FULL GLASS OF WATER TO HELP PREVENT HEADACHE &/OR MOOD, Disp: 60 tablet, Rfl: 1    amitriptyline (ELAVIL) 150 MG Tab, Take 1 tablet (150 mg total) by mouth every evening., Disp: 30 tablet, Rfl: 1    ARIPiprazole (ABILIFY) 10 MG Tab, Take 1 tablet (10 mg total) by mouth once daily., Disp: 30 tablet, Rfl: 1    clonazePAM (KLONOPIN) 1 MG tablet, Take 1 tablet (1 mg total) by mouth once daily., Disp: 15 tablet, Rfl: 1       Significant Labs: - none at this time    Significant Imaging: - none at this time    Physical Exam  Vitals and nursing note reviewed.   Constitutional:       General: She is awake.      Appearance: Normal appearance.   Musculoskeletal:      Comments: Full ROM   Neurological:      Mental Status: She is alert.   Psychiatric:         Attention and Perception: Attention and perception normal. She does not perceive auditory or visual hallucinations.         Mood and Affect: Affect normal.         Speech: Speech normal.         Behavior: Behavior is cooperative.         Thought Content: Thought content does not include homicidal or suicidal ideation.         Cognition and Memory: Cognition and memory normal.          Review of Systems     Mental Status Exam:  Presentation:  - Appearance: 48 y.o. year old White female, appears stated age, appears Casually dressed and Well groomed  - Motility: Erect when standing, Steady gait, No EPS or  "Tremors, No psychomotor agitation or retardation appreciated  - Behavior: calm, cooperative, good eye contact  Speech:  - Character/Organization: spontaneous, fluent, normal volume, normal rate, normal rhythm  Emotional State:  - Mood: "happy"   - Affect: congruent and appropriate  Thought:  - Process: logical, linear, organized , goal-directed  - Preoccupations: no ruminations, rituals, or phobias appreciated  - Delusions: no persecutory, paranoid, or grandiose delusions appreciated  - Perception: denies AVH, not actively responding to internal stimuli  - SI/HI: denies/denies  Sensorium & Intellect:  - Sensorium: AAOx4  - Memory: intact to recent and remote events  - Attention/Concentration: good/good  - Insight/Judgement: good/good    Gait: normal swing and stance  MSK:no rigidity appreciated    All other systems without acute issues unless noted in HPI      Assessment/Plan      ICD-10-CM ICD-9-CM    1. Moderate episode of recurrent major depressive disorder  F33.1 296.32 ARIPiprazole (ABILIFY) 10 MG Tab      amitriptyline (ELAVIL) 150 MG Tab      2. Generalized anxiety disorder  F41.1 300.02 clonazePAM (KLONOPIN) 1 MG tablet      3. Insomnia, unspecified type  G47.00 780.52       4. Tobacco user  Z72.0 305.1          Continue current medications without change    Potential side effects and risks vs benefits of current treatment plan reviewed with patient. Applicable black box warnings reviewed. Encouraged patient not to alter dosages or abruptly discontinue medications without contacting prescriber first, due to risk of worsening symptoms and decompensation of mental status. Warned of risks associated with herbal remedies and supplements while taking psychotropic medications and of the need to consult prescriber prior to adding any of these to current regimen. Patient should abstain from abuse of alcohol, prescription medications, and illicit drugs. Reviewed when to contact clinic and/or seek emergent care, such " as but not limited to, onset/worsening SI/HI, hallucinations, delusions, manic symptoms. Pt verbalized understanding and agreement of these warnings/recommendations and verbally consented to treatment plan.    Reviewed non-pharmacologic coping skills to decrease anxiety, such as deep breathing, journaling, exercise, recognizing triggers, meditation, healthy diet and exercise, routine sleep schedule, negative thought recognition, time for hobbies/interests, relaxation techniques, avoidance of substance abuse, limiting caffeine, benefits of counseling, and biofeedback. Patient verbalized understanding.     checked. Results as expected. No suspected diversion or abuse of controlled substances.    Encouraged smoking cessation and reinforced negative effects of continued use. Assistance with smoking cessation was offered, including medications, counseling, printed information, and referral to smoking cessation program. Encouraged patient to visit [email or web address] for further information and resources.      Follow up in about 2 months (around 1/20/2024) for Medication Management.    Maynor Charles, PAULETTEP

## 2024-01-19 ENCOUNTER — OFFICE VISIT (OUTPATIENT)
Dept: BEHAVIORAL HEALTH | Facility: CLINIC | Age: 49
End: 2024-01-19
Payer: MEDICAID

## 2024-01-19 VITALS
BODY MASS INDEX: 36.84 KG/M2 | HEIGHT: 60 IN | DIASTOLIC BLOOD PRESSURE: 68 MMHG | HEART RATE: 101 BPM | WEIGHT: 187.63 LBS | OXYGEN SATURATION: 98 % | SYSTOLIC BLOOD PRESSURE: 110 MMHG | TEMPERATURE: 98 F

## 2024-01-19 DIAGNOSIS — G62.9 NEUROPATHY: ICD-10-CM

## 2024-01-19 DIAGNOSIS — F33.1 MODERATE EPISODE OF RECURRENT MAJOR DEPRESSIVE DISORDER: Primary | ICD-10-CM

## 2024-01-19 DIAGNOSIS — Z72.0 TOBACCO USER: ICD-10-CM

## 2024-01-19 DIAGNOSIS — G47.00 INSOMNIA, UNSPECIFIED TYPE: ICD-10-CM

## 2024-01-19 DIAGNOSIS — G25.81 RESTLESS LEG: ICD-10-CM

## 2024-01-19 DIAGNOSIS — F41.1 GENERALIZED ANXIETY DISORDER: ICD-10-CM

## 2024-01-19 PROCEDURE — 3074F SYST BP LT 130 MM HG: CPT | Mod: CPTII,,, | Performed by: NURSE PRACTITIONER

## 2024-01-19 PROCEDURE — 1160F RVW MEDS BY RX/DR IN RCRD: CPT | Mod: CPTII,,, | Performed by: NURSE PRACTITIONER

## 2024-01-19 PROCEDURE — 3008F BODY MASS INDEX DOCD: CPT | Mod: CPTII,,, | Performed by: NURSE PRACTITIONER

## 2024-01-19 PROCEDURE — 99214 OFFICE O/P EST MOD 30 MIN: CPT | Mod: ,,, | Performed by: NURSE PRACTITIONER

## 2024-01-19 PROCEDURE — 3078F DIAST BP <80 MM HG: CPT | Mod: CPTII,,, | Performed by: NURSE PRACTITIONER

## 2024-01-19 PROCEDURE — 1159F MED LIST DOCD IN RCRD: CPT | Mod: CPTII,,, | Performed by: NURSE PRACTITIONER

## 2024-01-19 RX ORDER — ROPINIROLE 0.5 MG/1
TABLET, FILM COATED ORAL
Qty: 30 TABLET | Refills: 11 | Status: SHIPPED | OUTPATIENT
Start: 2024-01-19

## 2024-01-19 RX ORDER — TOPIRAMATE 100 MG/1
100 TABLET, FILM COATED ORAL DAILY
Qty: 30 TABLET | Refills: 1 | Status: SHIPPED | OUTPATIENT
Start: 2024-01-19 | End: 2024-03-27 | Stop reason: SDUPTHER

## 2024-01-19 RX ORDER — CLONAZEPAM 1 MG/1
1 TABLET ORAL DAILY
Qty: 15 TABLET | Refills: 1 | Status: SHIPPED | OUTPATIENT
Start: 2024-01-19 | End: 2024-03-27 | Stop reason: SDUPTHER

## 2024-01-19 RX ORDER — GABAPENTIN 600 MG/1
TABLET ORAL
Qty: 30 TABLET | Refills: 11 | Status: SHIPPED | OUTPATIENT
Start: 2024-01-19

## 2024-01-19 RX ORDER — ARIPIPRAZOLE 10 MG/1
10 TABLET ORAL DAILY
Qty: 30 TABLET | Refills: 1 | Status: SHIPPED | OUTPATIENT
Start: 2024-01-19 | End: 2024-03-27 | Stop reason: SDUPTHER

## 2024-01-19 RX ORDER — AMITRIPTYLINE HYDROCHLORIDE 150 MG/1
150 TABLET ORAL NIGHTLY
Qty: 30 TABLET | Refills: 1 | Status: SHIPPED | OUTPATIENT
Start: 2024-01-19 | End: 2024-03-27 | Stop reason: SDUPTHER

## 2024-01-19 NOTE — PROGRESS NOTES
PSYCHIATRIC FOLLOW-UP VISIT NOTE    Chief Complaint   Patient presents with    Depression     2 month F/U medication         History of Present Illness  49 y.o. year old White female with hx of MDD, OLIMPIA, insomnia, and tobacco use seen today for follow-up appointment and medication management.  Patient reports that she has been doing very well since last visit.  Denies any recent depression.  Mood has been stable and she denies any mood lability or irritability.  Anxiety also well-controlled with current medication regimen.  Requires p.r.n. Klonopin when dealing with her mother and other stressful family situations.  Sleeping well at night and feels rested in the morning.  Describes mood as happy recently.  No change in tobacco use. Patient denies SI/HI. Denies hallucinations and does not appear to be responding to internal stimuli or be internally preoccupied. No manic symptoms noted. Tolerating SGA therapy without serious side effects. No NMS s/s. No EPS or TD symptoms noted. AIMS=0.        Objective:     Vitals:  Vitals:    01/19/24 0704   BP: 110/68   BP Location: Right arm   Patient Position: Sitting   BP Method: Large (Manual)   Pulse: 101   Temp: 98.4 °F (36.9 °C)   TempSrc: Temporal   SpO2: 98%   Weight: 85.1 kg (187 lb 9.8 oz)   Height: 5' (1.524 m)       Wt Readings from Last 3 Encounters:   01/19/24 0704 85.1 kg (187 lb 9.8 oz)   11/20/23 0704 85.8 kg (189 lb 2.5 oz)   09/19/23 0706 86.9 kg (191 lb 9.3 oz)         Medication:    Current Outpatient Medications:     albuterol (PROVENTIL/VENTOLIN HFA) 90 mcg/actuation inhaler, Inhale 1 puff into the lungs 4 (four) times daily as needed., Disp: , Rfl:     amitriptyline (ELAVIL) 150 MG Tab, Take 1 tablet (150 mg total) by mouth every evening., Disp: 30 tablet, Rfl: 1    ARIPiprazole (ABILIFY) 10 MG Tab, Take 1 tablet (10 mg total) by mouth once daily., Disp: 30 tablet, Rfl: 1    clonazePAM (KLONOPIN) 1 MG tablet, Take 1 tablet (1 mg total) by mouth once daily.,  "Disp: 15 tablet, Rfl: 1    ferrous sulfate (FEOSOL) 325 mg (65 mg iron) Tab tablet, Take 1 tablet (325 mg total) by mouth every other day., Disp: 45 tablet, Rfl: 3    gabapentin (NEURONTIN) 600 MG tablet, TAKE ONE(1) TAB BY MOUTH ONCE A DAY WITH FOOD AS NEEDED FOR PAIN, Disp: 30 tablet, Rfl: 2    pantoprazole (PROTONIX) 40 MG tablet, TAKE ONE(1) TAB BY MOUTH ONCE A DAY FOR STOMACH &/OR REFLUX /DO NOT CRUSH OR CHEW, Disp: 30 tablet, Rfl: 5    rOPINIRole (REQUIP) 0.5 MG tablet, TAKE ONE(1) TAB BY MOUTH EVERY NIGHT AT BEDTIME /TAKE 1-3 HOURS BEFORE BEDTIME, Disp: 30 tablet, Rfl: 2    topiramate (TOPAMAX) 100 MG tablet, TAKE ONE(1) TAB BY MOUTH TWICE DAILY WITH FULL GLASS OF WATER TO HELP PREVENT HEADACHE &/OR MOOD, Disp: 60 tablet, Rfl: 1       Significant Labs: - none at this time    Significant Imaging: - none at this time    Physical Exam  Vitals and nursing note reviewed.   Constitutional:       General: She is awake.      Appearance: Normal appearance.   Musculoskeletal:      Comments: Full ROM   Neurological:      Mental Status: She is alert.   Psychiatric:         Attention and Perception: Attention and perception normal. She does not perceive auditory or visual hallucinations.         Mood and Affect: Affect normal.         Speech: Speech normal.         Behavior: Behavior is cooperative.         Thought Content: Thought content does not include homicidal or suicidal ideation.         Cognition and Memory: Cognition and memory normal.          Review of Systems     Mental Status Exam:  Presentation:  - Appearance: 49 y.o. year old White female, appears stated age, appears Casually dressed and Well groomed  - Motility: Erect when standing, Steady gait, No EPS or Tremors, No psychomotor agitation or retardation appreciated  - Behavior: calm, cooperative, good eye contact  Speech:  - Character/Organization: spontaneous, fluent, normal volume, normal rate, normal rhythm  Emotional State:  - Mood: "happy"   - " Affect: congruent and appropriate  Thought:  - Process: logical, linear, organized , goal-directed  - Preoccupations: no ruminations, rituals, or phobias appreciated  - Delusions: no persecutory, paranoid, or grandiose delusions appreciated  - Perception: denies AVH, not actively responding to internal stimuli  - SI/HI: denies/denies  Sensorium & Intellect:  - Sensorium: AAOx4  - Memory: intact to recent and remote events  - Attention/Concentration: good/good  - Insight/Judgement: good/good    Gait: normal swing and stance  MSK:no rigidity appreciated    All other systems without acute issues unless noted in HPI      Assessment/Plan      ICD-10-CM ICD-9-CM    1. Moderate episode of recurrent major depressive disorder  F33.1 296.32       2. Generalized anxiety disorder  F41.1 300.02       3. Insomnia, unspecified type  G47.00 780.52       4. Tobacco user  Z72.0 305.1          Decrease topiramate to 100mg daily. Pt has been taking medication at that dose for several months with good efficacy    Continue other medications without change    Potential side effects and risks vs benefits of current treatment plan reviewed with patient. Applicable black box warnings reviewed. Encouraged patient not to alter dosages or abruptly discontinue medications without contacting prescriber first, due to risk of worsening symptoms and decompensation of mental status. Warned of risks associated with herbal remedies and supplements while taking psychotropic medications and of the need to consult prescriber prior to adding any of these to current regimen. Patient should abstain from abuse of alcohol, prescription medications, and illicit drugs. Reviewed when to contact clinic and/or seek emergent care, such as but not limited to, onset/worsening SI/HI, hallucinations, delusions, manic symptoms. Pt verbalized understanding and agreement of these warnings/recommendations and verbally consented to treatment plan.      SGA labs due August  2023    Follow up in about 2 months (around 3/19/2024) for Medication Management.    Maynor Charles, PAULETTEP

## 2024-03-27 ENCOUNTER — OFFICE VISIT (OUTPATIENT)
Dept: BEHAVIORAL HEALTH | Facility: CLINIC | Age: 49
End: 2024-03-27

## 2024-03-27 VITALS
TEMPERATURE: 98 F | DIASTOLIC BLOOD PRESSURE: 78 MMHG | SYSTOLIC BLOOD PRESSURE: 128 MMHG | BODY MASS INDEX: 37.49 KG/M2 | HEIGHT: 60 IN | OXYGEN SATURATION: 98 % | HEART RATE: 105 BPM | WEIGHT: 190.94 LBS

## 2024-03-27 DIAGNOSIS — Z72.0 TOBACCO USER: ICD-10-CM

## 2024-03-27 DIAGNOSIS — F33.1 MODERATE EPISODE OF RECURRENT MAJOR DEPRESSIVE DISORDER: Primary | ICD-10-CM

## 2024-03-27 DIAGNOSIS — G47.00 INSOMNIA, UNSPECIFIED TYPE: ICD-10-CM

## 2024-03-27 DIAGNOSIS — G25.81 RESTLESS LEG: ICD-10-CM

## 2024-03-27 DIAGNOSIS — F41.1 GENERALIZED ANXIETY DISORDER: ICD-10-CM

## 2024-03-27 PROCEDURE — 99212 OFFICE O/P EST SF 10 MIN: CPT | Mod: ,,, | Performed by: NURSE PRACTITIONER

## 2024-03-27 RX ORDER — ARIPIPRAZOLE 10 MG/1
10 TABLET ORAL DAILY
Qty: 30 TABLET | Refills: 1 | Status: SHIPPED | OUTPATIENT
Start: 2024-03-27

## 2024-03-27 RX ORDER — TOPIRAMATE 100 MG/1
100 TABLET, FILM COATED ORAL DAILY
Qty: 30 TABLET | Refills: 1 | Status: SHIPPED | OUTPATIENT
Start: 2024-03-27

## 2024-03-27 RX ORDER — CLONAZEPAM 1 MG/1
1 TABLET ORAL DAILY
Qty: 15 TABLET | Refills: 1 | Status: SHIPPED | OUTPATIENT
Start: 2024-03-27

## 2024-03-27 RX ORDER — AMITRIPTYLINE HYDROCHLORIDE 150 MG/1
150 TABLET ORAL NIGHTLY
Qty: 30 TABLET | Refills: 1 | Status: SHIPPED | OUTPATIENT
Start: 2024-03-27

## 2024-03-27 NOTE — PROGRESS NOTES
PSYCHIATRIC FOLLOW-UP VISIT NOTE    Chief Complaint   Patient presents with    Depression     Medication Management         History of Present Illness  49 y.o. year old White female with hx of MDD, OLIMPIA, insomnia, and tobacco use seen today for follow-up appointment and medication management.  Patient reports that she has been doing well for the most part since her last visit.  Denies any recent depression.  Has had some increased anxiety secondary to losing her Medicaid coverage due to barely crossing the maximum allowed income threshold.  She was found this a bit frustrating and overwhelming.  She was unable to secure coverage through her workplace but is considering other options.  She feels she is coping this effectively at present.  Still sleeping well most nights and feels rested in the morning.  Denies any side effects from current medication regimen. Patient denies SI/HI. Denies hallucinations and does not appear to be responding to internal stimuli or be internally preoccupied. No manic symptoms noted. Tolerating SGA therapy without serious side effects. No NMS s/s. No EPS or TD symptoms noted. AIMS=0.        Objective:     Vitals:  Vitals:    03/27/24 0705   BP: 128/78   BP Location: Right arm   Patient Position: Sitting   BP Method: Large (Manual)   Pulse: 105   Temp: 97.5 °F (36.4 °C)   TempSrc: Temporal   SpO2: 98%   Weight: 86.6 kg (190 lb 14.7 oz)   Height: 5' (1.524 m)       Wt Readings from Last 3 Encounters:   03/27/24 0705 86.6 kg (190 lb 14.7 oz)   01/19/24 0704 85.1 kg (187 lb 9.8 oz)   11/20/23 0704 85.8 kg (189 lb 2.5 oz)         Medication:    Current Outpatient Medications:     albuterol (PROVENTIL/VENTOLIN HFA) 90 mcg/actuation inhaler, Inhale 1 puff into the lungs 4 (four) times daily as needed., Disp: , Rfl:     gabapentin (NEURONTIN) 600 MG tablet, TAKE ONE(1) TAB BY MOUTH ONCE A DAY WITH FOOD AS NEEDED FOR PAIN, Disp: 30 tablet, Rfl: 11    pantoprazole (PROTONIX) 40 MG tablet, TAKE  ONE(1) TAB BY MOUTH ONCE A DAY FOR STOMACH &/OR REFLUX /DO NOT CRUSH OR CHEW, Disp: 30 tablet, Rfl: 5    amitriptyline (ELAVIL) 150 MG Tab, Take 1 tablet (150 mg total) by mouth every evening., Disp: 30 tablet, Rfl: 1    ARIPiprazole (ABILIFY) 10 MG Tab, Take 1 tablet (10 mg total) by mouth once daily., Disp: 30 tablet, Rfl: 1    clonazePAM (KLONOPIN) 1 MG tablet, Take 1 tablet (1 mg total) by mouth once daily., Disp: 15 tablet, Rfl: 1    ferrous sulfate (FEOSOL) 325 mg (65 mg iron) Tab tablet, Take 1 tablet (325 mg total) by mouth every other day. (Patient not taking: Reported on 3/27/2024), Disp: 45 tablet, Rfl: 3    rOPINIRole (REQUIP) 0.5 MG tablet, TAKE ONE(1) TAB BY MOUTH EVERY NIGHT AT BEDTIME /TAKE 1-3 HOURS BEFORE BEDTIME (Patient not taking: Reported on 3/27/2024), Disp: 30 tablet, Rfl: 11    topiramate (TOPAMAX) 100 MG tablet, Take 1 tablet (100 mg total) by mouth once daily., Disp: 30 tablet, Rfl: 1       Significant Labs: - none at this time    Significant Imaging: - none at this time    Physical Exam  Vitals and nursing note reviewed.   Constitutional:       General: She is awake.      Appearance: Normal appearance.   Musculoskeletal:      Comments: Full ROM   Neurological:      Mental Status: She is alert.   Psychiatric:         Attention and Perception: Attention and perception normal. She does not perceive auditory or visual hallucinations.         Mood and Affect: Affect normal.         Speech: Speech normal.         Behavior: Behavior is cooperative.         Thought Content: Thought content does not include homicidal or suicidal ideation.         Cognition and Memory: Cognition and memory normal.          Review of Systems     Mental Status Exam:  Presentation:  - Appearance: 49 y.o. year old White female, appears stated age, appears Casually dressed and Well groomed  - Motility: Erect when standing, Steady gait, No EPS or Tremors, No psychomotor agitation or retardation appreciated  - Behavior:  "calm, cooperative, good eye contact  Speech:  - Character/Organization: spontaneous, fluent, normal volume, normal rate, normal rhythm  Emotional State:  - Mood: "anxiety"   - Affect: congruent and appropriate  Thought:  - Process: logical, linear, organized , goal-directed  - Preoccupations: no ruminations, rituals, or phobias appreciated  - Delusions: no persecutory, paranoid, or grandiose delusions appreciated  - Perception: denies AVH, not actively responding to internal stimuli  - SI/HI: denies/denies  Sensorium & Intellect:  - Sensorium: AAOx4  - Memory: intact to recent and remote events  - Attention/Concentration: good/good  - Insight/Judgement: good/good    Gait: normal swing and stance  MSK:no rigidity appreciated    All other systems without acute issues unless noted in HPI      Assessment/Plan      ICD-10-CM ICD-9-CM    1. Moderate episode of recurrent major depressive disorder  F33.1 296.32 amitriptyline (ELAVIL) 150 MG Tab      ARIPiprazole (ABILIFY) 10 MG Tab      topiramate (TOPAMAX) 100 MG tablet      2. Generalized anxiety disorder  F41.1 300.02 clonazePAM (KLONOPIN) 1 MG tablet      3. Insomnia, unspecified type  G47.00 780.52       4. Tobacco user  Z72.0 305.1       5. Restless leg  G25.81 333.94          Continue current medications without change    Potential side effects and risks vs benefits of current treatment plan reviewed with patient. Applicable black box warnings reviewed. Encouraged patient not to alter dosages or abruptly discontinue medications without contacting prescriber first, due to risk of worsening symptoms and decompensation of mental status. Warned of risks associated with herbal remedies and supplements while taking psychotropic medications and of the need to consult prescriber prior to adding any of these to current regimen. Patient should abstain from abuse of alcohol, prescription medications, and illicit drugs. Reviewed when to contact clinic and/or seek emergent care, " such as but not limited to, onset/worsening SI/HI, hallucinations, delusions, manic symptoms. Pt verbalized understanding and agreement of these warnings/recommendations and verbally consented to treatment plan.     checked. Results as expected. No suspected diversion or abuse of controlled substances.      Follow up in about 3 months (around 6/27/2024) for Medication Management.    Maynor Charles, [facility]

## 2024-04-01 DIAGNOSIS — K21.9 GASTROESOPHAGEAL REFLUX DISEASE, UNSPECIFIED WHETHER ESOPHAGITIS PRESENT: ICD-10-CM

## 2024-04-01 RX ORDER — PANTOPRAZOLE SODIUM 40 MG/1
TABLET, DELAYED RELEASE ORAL
Qty: 30 TABLET | Refills: 5 | Status: SHIPPED | OUTPATIENT
Start: 2024-04-01

## 2024-06-27 ENCOUNTER — OFFICE VISIT (OUTPATIENT)
Dept: BEHAVIORAL HEALTH | Facility: CLINIC | Age: 49
End: 2024-06-27

## 2024-06-27 ENCOUNTER — TELEPHONE (OUTPATIENT)
Dept: FAMILY MEDICINE | Facility: CLINIC | Age: 49
End: 2024-06-27

## 2024-06-27 VITALS
HEIGHT: 60 IN | BODY MASS INDEX: 38.48 KG/M2 | WEIGHT: 196 LBS | SYSTOLIC BLOOD PRESSURE: 128 MMHG | TEMPERATURE: 98 F | DIASTOLIC BLOOD PRESSURE: 86 MMHG | HEART RATE: 105 BPM | OXYGEN SATURATION: 99 %

## 2024-06-27 DIAGNOSIS — G47.00 INSOMNIA, UNSPECIFIED TYPE: ICD-10-CM

## 2024-06-27 DIAGNOSIS — F41.1 GENERALIZED ANXIETY DISORDER: ICD-10-CM

## 2024-06-27 DIAGNOSIS — F33.1 MODERATE EPISODE OF RECURRENT MAJOR DEPRESSIVE DISORDER: Primary | ICD-10-CM

## 2024-06-27 DIAGNOSIS — Z72.0 TOBACCO USER: ICD-10-CM

## 2024-06-27 PROCEDURE — 99214 OFFICE O/P EST MOD 30 MIN: CPT | Mod: ,,, | Performed by: NURSE PRACTITIONER

## 2024-06-27 RX ORDER — AMITRIPTYLINE HYDROCHLORIDE 150 MG/1
150 TABLET ORAL NIGHTLY
Qty: 30 TABLET | Refills: 0 | Status: SHIPPED | OUTPATIENT
Start: 2024-06-27

## 2024-06-27 RX ORDER — BREXPIPRAZOLE 1 MG/1
1 TABLET ORAL DAILY
Qty: 30 TABLET | Refills: 0 | Status: SHIPPED | OUTPATIENT
Start: 2024-06-27

## 2024-06-27 RX ORDER — TOPIRAMATE 100 MG/1
100 TABLET, FILM COATED ORAL DAILY
Qty: 30 TABLET | Refills: 0 | Status: SHIPPED | OUTPATIENT
Start: 2024-06-27

## 2024-06-27 RX ORDER — CLONAZEPAM 1 MG/1
1 TABLET ORAL DAILY
Qty: 15 TABLET | Refills: 0 | Status: SHIPPED | OUTPATIENT
Start: 2024-06-27

## 2024-06-27 RX ORDER — BREXPIPRAZOLE 1 MG/1
1 TABLET ORAL DAILY
COMMUNITY
Start: 2024-06-27 | End: 2024-07-03

## 2024-06-27 NOTE — PROGRESS NOTES
PSYCHIATRIC FOLLOW-UP VISIT NOTE    Chief Complaint   Patient presents with    Medication Management     3 month medication management         History of Present Illness  49 y.o. year old White female with hx of MDD, OLIMPIA, insomnia, and tobacco use seen today for follow-up appointment and medication management.  Patient reports increased depression and anxiety for the past 2 months.  Both of her parents had surgery approximately 2 months ago and patient has spent to Street weeks being the primary caretakers.  Spending this much time with her mother has led to a severe decompensation in mood as well as an increase in her anxiety.  She has had intrusive thoughts and morbid ruminations.  Her family is involved in her safety planning.  Her sister administers her medications and their lower longer any weapons in her home.  She frequently feels overwhelmed and with low self-esteem.  Frequently tearful and irritable.  She is still attending to her responsibilities at work and at home.  Good ADLs.  Adamantly denies any plan or intent to harm herself.  She was also been exercising with her sister.  Her sister is also living with her for the time being. Patient denies SI/HI. Denies hallucinations and does not appear to be responding to internal stimuli or be internally preoccupied. No manic symptoms noted. Tolerating SGA therapy without serious side effects. No NMS s/s. No EPS or TD symptoms noted. AIMS=0.        Objective:     Vitals:  Vitals:    06/27/24 0706   BP: 128/86   BP Location: Right arm   Patient Position: Sitting   BP Method: Large (Manual)   Pulse: 105   Temp: 98.4 °F (36.9 °C)   TempSrc: Temporal   SpO2: 99%   Weight: 88.9 kg (195 lb 15.8 oz)   Height: 5' (1.524 m)       Wt Readings from Last 3 Encounters:   06/27/24 0706 88.9 kg (195 lb 15.8 oz)   03/27/24 0705 86.6 kg (190 lb 14.7 oz)   01/19/24 0704 85.1 kg (187 lb 9.8 oz)         Medication:    Current Outpatient Medications:     albuterol  (PROVENTIL/VENTOLIN HFA) 90 mcg/actuation inhaler, Inhale 1 puff into the lungs 4 (four) times daily as needed., Disp: , Rfl:     brexpiprazole (REXULTI) 1 mg Tab, Take 1 mg by mouth Daily. 7 day supply samples given LOT # - [id number] EXP - 04/2026, Disp: , Rfl:     ferrous sulfate (FEOSOL) 325 mg (65 mg iron) Tab tablet, Take 1 tablet (325 mg total) by mouth every other day., Disp: 45 tablet, Rfl: 3    gabapentin (NEURONTIN) 600 MG tablet, TAKE ONE(1) TAB BY MOUTH ONCE A DAY WITH FOOD AS NEEDED FOR PAIN, Disp: 30 tablet, Rfl: 11    pantoprazole (PROTONIX) 40 MG tablet, TAKE ONE(1) TAB BY MOUTH ONCE A DAY FOR STOMACH &/OR REFLUX /DO NOT CRUSH OR CHEW, Disp: 30 tablet, Rfl: 5    amitriptyline (ELAVIL) 150 MG Tab, Take 1 tablet (150 mg total) by mouth every evening., Disp: 30 tablet, Rfl: 0    brexpiprazole (REXULTI) 1 mg Tab, Take 1 tablet (1 mg total) by mouth once daily., Disp: 30 tablet, Rfl: 0    clonazePAM (KLONOPIN) 1 MG tablet, Take 1 tablet (1 mg total) by mouth once daily., Disp: 15 tablet, Rfl: 0    rOPINIRole (REQUIP) 0.5 MG tablet, TAKE ONE(1) TAB BY MOUTH EVERY NIGHT AT BEDTIME /TAKE 1-3 HOURS BEFORE BEDTIME (Patient not taking: Reported on 3/27/2024), Disp: 30 tablet, Rfl: 11    topiramate (TOPAMAX) 100 MG tablet, Take 1 tablet (100 mg total) by mouth once daily., Disp: 30 tablet, Rfl: 0       Significant Labs: - none at this time    Significant Imaging: - none at this time    Physical Exam  Vitals and nursing note reviewed.   Constitutional:       General: She is awake.      Appearance: Normal appearance.   Musculoskeletal:      Comments: Full ROM   Neurological:      Mental Status: She is alert.   Psychiatric:         Attention and Perception: Attention and perception normal. She does not perceive auditory or visual hallucinations.         Mood and Affect: Mood is anxious and depressed. Affect is tearful.         Speech: Speech normal.         Behavior: Behavior is withdrawn. Behavior is cooperative.    "      Thought Content: Thought content does not include homicidal or suicidal ideation.         Cognition and Memory: Cognition and memory normal.         Judgment: Judgment normal.      Comments: Morbid ruminations and intrusive thoughts          Review of Systems     Mental Status Exam:  Presentation:  - Appearance: 49 y.o. year old White female, appears stated age, appears Casually dressed and Well groomed  - Motility: Erect when standing, Steady gait, No EPS or Tremors, No psychomotor agitation or retardation appreciated  - Behavior: anxious, cooperative, doesn't maintain eye contact, sullen  Speech:  - Character/Organization: spontaneous, fluent, normal volume, normal rate, normal rhythm  Emotional State:  - Mood: "anxious and depressed"   - Affect: congruent, depressed, and tearful  Thought:  - Process: logical, linear, organized , goal-directed  - Preoccupations: guilt, family  - Delusions: no persecutory, paranoid, or grandiose delusions appreciated  - Perception: denies AVH, not actively responding to internal stimuli  - SI/HI: denies/denies  Sensorium & Intellect:  - Sensorium: AAOx4  - Memory: intact to recent and remote events  - Attention/Concentration: fair  - Insight/Judgement: good/good    Gait: normal swing and stance  MSK:no rigidity appreciated    All other systems without acute issues unless noted in HPI      Assessment/Plan      ICD-10-CM ICD-9-CM    1. Moderate episode of recurrent major depressive disorder  F33.1 296.32 topiramate (TOPAMAX) 100 MG tablet      amitriptyline (ELAVIL) 150 MG Tab      brexpiprazole (REXULTI) 1 mg Tab      2. Generalized anxiety disorder  F41.1 300.02 clonazePAM (KLONOPIN) 1 MG tablet      3. Insomnia, unspecified type  G47.00 780.52       4. Tobacco user  Z72.0 305.1          Discontinue Abilify due to inefficacy    Start Rexulti 1 mg every morning.  One week sample pack provided to patient.    Continue other medications without change.  Consider hospitalization " if no improvement by next visit    Potential side effects and risks vs benefits of current treatment plan reviewed with patient. Applicable black box warnings reviewed. Encouraged patient not to alter dosages or abruptly discontinue medications without contacting prescriber first, due to risk of worsening symptoms and decompensation of mental status. Warned of risks associated with herbal remedies and supplements while taking psychotropic medications and of the need to consult prescriber prior to adding any of these to current regimen. Patient should abstain from abuse of alcohol, prescription medications, and illicit drugs. Reviewed when to contact clinic and/or seek emergent care, such as but not limited to, onset/worsening SI/HI, hallucinations, delusions, manic symptoms. Pt verbalized understanding and agreement of these warnings/recommendations and verbally consented to treatment plan.    Reviewed and confirmed patient's safety plan. Pt adamantly denies that they are a threat to self or others at this time, and current s/s support this. Pt verbally contracted for safety at conclusion of today's visit and verbalized when to seek emergent care. Pt also identified primary support person they can contact if symptoms begin to worsen, and they will contact clinic ASAP if this occurs.  Patient's parents and sister are involved in her safety plan.  No weapons in the home.  Sister administers her medications and is currently living with the patient.      Follow up in about 1 week (around 7/4/2024) for Medication Management.    Maynor Charles, [facility]

## 2024-06-27 NOTE — TELEPHONE ENCOUNTER
Called patient and asked her to come in and get paperwork to fill out for assistance with her rexulti. Patient stated that she would  paperwork today.

## 2024-07-01 ENCOUNTER — TELEPHONE (OUTPATIENT)
Dept: BEHAVIORAL HEALTH | Facility: CLINIC | Age: 49
End: 2024-07-01

## 2024-07-01 DIAGNOSIS — F33.1 MODERATE EPISODE OF RECURRENT MAJOR DEPRESSIVE DISORDER: Primary | ICD-10-CM

## 2024-07-01 RX ORDER — BREXPIPRAZOLE 1 MG/1
1 TABLET ORAL DAILY
Qty: 30 TABLET | Refills: 5 | Status: SHIPPED | OUTPATIENT
Start: 2024-07-01

## 2024-07-03 ENCOUNTER — TELEPHONE (OUTPATIENT)
Dept: BEHAVIORAL HEALTH | Facility: CLINIC | Age: 49
End: 2024-07-03

## 2024-07-03 ENCOUNTER — OFFICE VISIT (OUTPATIENT)
Dept: BEHAVIORAL HEALTH | Facility: CLINIC | Age: 49
End: 2024-07-03

## 2024-07-03 VITALS
OXYGEN SATURATION: 98 % | DIASTOLIC BLOOD PRESSURE: 86 MMHG | HEART RATE: 99 BPM | HEIGHT: 60 IN | TEMPERATURE: 98 F | SYSTOLIC BLOOD PRESSURE: 120 MMHG | WEIGHT: 193.81 LBS | BODY MASS INDEX: 38.05 KG/M2

## 2024-07-03 DIAGNOSIS — G47.00 INSOMNIA, UNSPECIFIED TYPE: ICD-10-CM

## 2024-07-03 DIAGNOSIS — F33.1 MODERATE EPISODE OF RECURRENT MAJOR DEPRESSIVE DISORDER: Primary | ICD-10-CM

## 2024-07-03 DIAGNOSIS — F41.1 GENERALIZED ANXIETY DISORDER: ICD-10-CM

## 2024-07-03 DIAGNOSIS — Z72.0 TOBACCO USER: ICD-10-CM

## 2024-07-03 PROCEDURE — 99214 OFFICE O/P EST MOD 30 MIN: CPT | Mod: ,,, | Performed by: NURSE PRACTITIONER

## 2024-07-03 RX ORDER — TOPIRAMATE 100 MG/1
100 TABLET, FILM COATED ORAL DAILY
Qty: 30 TABLET | Refills: 0 | Status: SHIPPED | OUTPATIENT
Start: 2024-07-03

## 2024-07-03 RX ORDER — BREXPIPRAZOLE 0.5 MG-1MG
1 KIT ORAL DAILY
COMMUNITY

## 2024-07-03 RX ORDER — AMITRIPTYLINE HYDROCHLORIDE 150 MG/1
150 TABLET ORAL NIGHTLY
Qty: 30 TABLET | Refills: 0 | Status: SHIPPED | OUTPATIENT
Start: 2024-07-03

## 2024-07-03 NOTE — PROGRESS NOTES
PSYCHIATRIC FOLLOW-UP VISIT NOTE    Chief Complaint   Patient presents with    Medication Management     1 week medication management         History of Present Illness  49 y.o. year old White female with hx of MDD, OLIMPIA, insomnia, and tobacco use seen today for follow-up appointment and medication management.  Patient reports improvement since initiation of Rexulti.  No longer having any thoughts of self-harm.  Mood has improved.  She was experiencing some emotional blunting, states her family is primarily noticed this.  She was doing well at work and no in his made any comments in that setting.  Still having some tearful episodes when overwhelmed but they are less intense and less frequent.  Had some mild headaches when starting Rexulti but these have since improved.  We agreed to allow more time for efficacy.  If emotional blunting persists we may consider an alternative SGA. Patient denies SI/HI. Denies hallucinations and does not appear to be responding to internal stimuli or be internally preoccupied. No manic symptoms noted. Tolerating SGA therapy without serious side effects. No NMS s/s. No EPS or TD symptoms noted. AIMS=0.        Objective:     Vitals:  Vitals:    07/03/24 0714   BP: 120/86   BP Location: Right arm   Patient Position: Sitting   BP Method: Large (Manual)   Pulse: 99   Temp: 97.7 °F (36.5 °C)   TempSrc: Temporal   SpO2: 98%   Weight: 87.9 kg (193 lb 12.6 oz)   Height: 5' (1.524 m)       Wt Readings from Last 3 Encounters:   07/03/24 0714 87.9 kg (193 lb 12.6 oz)   06/27/24 0706 88.9 kg (195 lb 15.8 oz)   03/27/24 0705 86.6 kg (190 lb 14.7 oz)         Medication:    Current Outpatient Medications:     albuterol (PROVENTIL/VENTOLIN HFA) 90 mcg/actuation inhaler, Inhale 1 puff into the lungs 4 (four) times daily as needed., Disp: , Rfl:     brexpiprazole (REXULTI) 1 mg Tab, Take 1 tablet (1 mg total) by mouth once daily., Disp: 30 tablet, Rfl: 5    clonazePAM (KLONOPIN) 1 MG tablet, Take 1 tablet  (1 mg total) by mouth once daily., Disp: 15 tablet, Rfl: 0    ferrous sulfate (FEOSOL) 325 mg (65 mg iron) Tab tablet, Take 1 tablet (325 mg total) by mouth every other day., Disp: 45 tablet, Rfl: 3    gabapentin (NEURONTIN) 600 MG tablet, TAKE ONE(1) TAB BY MOUTH ONCE A DAY WITH FOOD AS NEEDED FOR PAIN, Disp: 30 tablet, Rfl: 11    pantoprazole (PROTONIX) 40 MG tablet, TAKE ONE(1) TAB BY MOUTH ONCE A DAY FOR STOMACH &/OR REFLUX /DO NOT CRUSH OR CHEW, Disp: 30 tablet, Rfl: 5    amitriptyline (ELAVIL) 150 MG Tab, Take 1 tablet (150 mg total) by mouth every evening., Disp: 30 tablet, Rfl: 0    topiramate (TOPAMAX) 100 MG tablet, Take 1 tablet (100 mg total) by mouth once daily., Disp: 30 tablet, Rfl: 0       Significant Labs: - none at this time    Significant Imaging: - none at this time    Physical Exam  Vitals and nursing note reviewed.   Constitutional:       General: She is awake.      Appearance: Normal appearance.   Musculoskeletal:      Comments: Full ROM   Neurological:      Mental Status: She is alert.   Psychiatric:         Attention and Perception: Attention and perception normal. She does not perceive auditory or visual hallucinations.         Mood and Affect: Affect normal. Mood is anxious and depressed (improving).         Speech: Speech normal.         Behavior: Behavior is cooperative.         Thought Content: Thought content does not include homicidal or suicidal ideation.         Cognition and Memory: Cognition and memory normal.         Judgment: Judgment normal.          Review of Systems     Mental Status Exam:  Presentation:  - Appearance: 49 y.o. year old White female, appears stated age, appears Casually dressed and Well groomed  - Motility: Erect when standing, Steady gait, No EPS or Tremors, No psychomotor agitation or retardation appreciated  - Behavior: anxious, cooperative, maintains eye contact  Speech:  - Character/Organization: spontaneous, fluent, normal volume, normal rate, normal  "rhythm  Emotional State:  - Mood: "anxious"   - Affect: congruent and anxious  Thought:  - Process: logical, linear, organized , goal-directed  - Preoccupations: no ruminations, rituals, or phobias appreciated  - Delusions: no persecutory, paranoid, or grandiose delusions appreciated  - Perception: denies AVH, not actively responding to internal stimuli  - SI/HI: denies/denies  Sensorium & Intellect:  - Sensorium: AAOx4  - Memory: intact to recent and remote events  - Attention/Concentration: good/good  - Insight/Judgement: good/good    Gait: normal swing and stance  MSK:no rigidity appreciated    All other systems without acute issues unless noted in HPI      Assessment/Plan      ICD-10-CM ICD-9-CM    1. Moderate episode of recurrent major depressive disorder  F33.1 296.32 amitriptyline (ELAVIL) 150 MG Tab      topiramate (TOPAMAX) 100 MG tablet      2. Generalized anxiety disorder  F41.1 300.02       3. Insomnia, unspecified type  G47.00 780.52       4. Tobacco user  Z72.0 305.1          Continue current medications without change    Potential side effects and risks vs benefits of current treatment plan reviewed with patient. Applicable black box warnings reviewed. Encouraged patient not to alter dosages or abruptly discontinue medications without contacting prescriber first, due to risk of worsening symptoms and decompensation of mental status. Warned of risks associated with herbal remedies and supplements while taking psychotropic medications and of the need to consult prescriber prior to adding any of these to current regimen. Patient should abstain from abuse of alcohol, prescription medications, and illicit drugs. Reviewed when to contact clinic and/or seek emergent care, such as but not limited to, onset/worsening SI/HI, hallucinations, delusions, manic symptoms. Pt verbalized understanding and agreement of these warnings/recommendations and verbally consented to treatment plan.      Follow up in about 6 " weeks (around 8/14/2024) for Medication Management.    Maynor Charles, [facility]

## 2024-08-16 DIAGNOSIS — F41.1 GENERALIZED ANXIETY DISORDER: ICD-10-CM

## 2024-08-16 RX ORDER — CLONAZEPAM 1 MG/1
TABLET ORAL
Qty: 15 TABLET | Refills: 0 | OUTPATIENT
Start: 2024-08-16

## 2024-08-20 ENCOUNTER — OFFICE VISIT (OUTPATIENT)
Dept: BEHAVIORAL HEALTH | Facility: CLINIC | Age: 49
End: 2024-08-20

## 2024-08-20 VITALS
DIASTOLIC BLOOD PRESSURE: 68 MMHG | OXYGEN SATURATION: 98 % | HEART RATE: 102 BPM | WEIGHT: 197.31 LBS | SYSTOLIC BLOOD PRESSURE: 98 MMHG | TEMPERATURE: 97 F | HEIGHT: 60 IN | BODY MASS INDEX: 38.74 KG/M2

## 2024-08-20 DIAGNOSIS — Z79.899 LONG TERM CURRENT USE OF ANTIPSYCHOTIC MEDICATION: ICD-10-CM

## 2024-08-20 DIAGNOSIS — F33.1 MODERATE EPISODE OF RECURRENT MAJOR DEPRESSIVE DISORDER: Primary | ICD-10-CM

## 2024-08-20 DIAGNOSIS — G47.00 INSOMNIA, UNSPECIFIED TYPE: ICD-10-CM

## 2024-08-20 DIAGNOSIS — F41.1 GENERALIZED ANXIETY DISORDER: ICD-10-CM

## 2024-08-20 DIAGNOSIS — Z72.0 TOBACCO USER: ICD-10-CM

## 2024-08-20 PROCEDURE — 99214 OFFICE O/P EST MOD 30 MIN: CPT | Mod: ,,, | Performed by: NURSE PRACTITIONER

## 2024-08-20 RX ORDER — TOPIRAMATE 100 MG/1
100 TABLET, FILM COATED ORAL DAILY
Qty: 30 TABLET | Refills: 0 | Status: SHIPPED | OUTPATIENT
Start: 2024-08-20

## 2024-08-20 RX ORDER — CLONAZEPAM 1 MG/1
1 TABLET ORAL DAILY
Qty: 15 TABLET | Refills: 0 | Status: SHIPPED | OUTPATIENT
Start: 2024-08-20

## 2024-08-20 RX ORDER — AMITRIPTYLINE HYDROCHLORIDE 150 MG/1
150 TABLET ORAL NIGHTLY
Qty: 30 TABLET | Refills: 0 | Status: SHIPPED | OUTPATIENT
Start: 2024-08-20

## 2024-08-20 NOTE — PROGRESS NOTES
PSYCHIATRIC FOLLOW-UP VISIT NOTE    Chief Complaint   Patient presents with    Medication Management     6 week medication management         History of Present Illness  49 y.o. year old White female with hx of MDD, OLIMPIA, insomnia, and tobacco use seen today for follow-up appointment and medication management.  Patient was responded positively to discontinuation of Abilify in addition of Rexulti.  Depression and anxiety have significantly improved.  No more morbid ruminations or intrusive thoughts.  Adamantly denies any thoughts of harming self or others.  Does have some persistent mild anxiety related to her mother having knee surgery in 10 days and trying to coordinate with her sister to determine who will be her primary caretaker in the postoperative period.  Patient's mother has long been a very triggering presents for her and she was trying to be minimally involved and day-to-day care following the procedure.  She has been sleeping well and feels rested in the morning.  Denies any side effects from current medication regimen. Patient denies SI/HI. Denies hallucinations and does not appear to be responding to internal stimuli or be internally preoccupied. No manic symptoms noted. Tolerating SGA therapy without serious side effects. No NMS s/s. No EPS or TD symptoms noted. AIMS=0.        Objective:     Vitals:  Vitals:    08/20/24 0709   BP: 98/68   BP Location: Right arm   Patient Position: Sitting   BP Method: Large (Manual)   Pulse: 102   Temp: 96.8 °F (36 °C)   TempSrc: Temporal   SpO2: 98%   Weight: 89.5 kg (197 lb 5 oz)   Height: 5' (1.524 m)       Wt Readings from Last 3 Encounters:   08/20/24 0709 89.5 kg (197 lb 5 oz)   07/03/24 0714 87.9 kg (193 lb 12.6 oz)   06/27/24 0706 88.9 kg (195 lb 15.8 oz)         Medication:    Current Outpatient Medications:     albuterol (PROVENTIL/VENTOLIN HFA) 90 mcg/actuation inhaler, Inhale 1 puff into the lungs 4 (four) times daily as needed., Disp: , Rfl:      brexpiprazole (REXULTI) 1 mg Tab, Take 1 tablet (1 mg total) by mouth once daily., Disp: 30 tablet, Rfl: 5    ferrous sulfate (FEOSOL) 325 mg (65 mg iron) Tab tablet, Take 1 tablet (325 mg total) by mouth every other day., Disp: 45 tablet, Rfl: 3    gabapentin (NEURONTIN) 600 MG tablet, TAKE ONE(1) TAB BY MOUTH ONCE A DAY WITH FOOD AS NEEDED FOR PAIN, Disp: 30 tablet, Rfl: 11    pantoprazole (PROTONIX) 40 MG tablet, TAKE ONE(1) TAB BY MOUTH ONCE A DAY FOR STOMACH &/OR REFLUX /DO NOT CRUSH OR CHEW, Disp: 30 tablet, Rfl: 5    amitriptyline (ELAVIL) 150 MG Tab, Take 1 tablet (150 mg total) by mouth every evening., Disp: 30 tablet, Rfl: 0    clonazePAM (KLONOPIN) 1 MG tablet, Take 1 tablet (1 mg total) by mouth once daily., Disp: 15 tablet, Rfl: 0    topiramate (TOPAMAX) 100 MG tablet, Take 1 tablet (100 mg total) by mouth once daily., Disp: 30 tablet, Rfl: 0       Significant Labs: - none at this time    Significant Imaging: - none at this time    Physical Exam  Vitals and nursing note reviewed.   Constitutional:       General: She is awake.      Appearance: Normal appearance.   Musculoskeletal:      Comments: Full ROM   Neurological:      Mental Status: She is alert.   Psychiatric:         Attention and Perception: Attention and perception normal. She does not perceive auditory or visual hallucinations.         Mood and Affect: Affect normal. Mood is anxious.         Speech: Speech normal.         Behavior: Behavior is cooperative.         Thought Content: Thought content does not include homicidal or suicidal ideation.         Cognition and Memory: Cognition and memory normal.          Review of Systems     Mental Status Exam:  Presentation:  - Appearance: 49 y.o. year old White female, appears stated age, appears Casually dressed and Well groomed  - Motility: Erect when standing, Steady gait, No EPS or Tremors, No psychomotor agitation or retardation appreciated  - Behavior: calm, cooperative, good eye  "contact  Speech:  - Character/Organization: spontaneous, fluent, normal volume, normal rate, normal rhythm  Emotional State:  - Mood: "anxious"   - Affect: congruent and appropriate  Thought:  - Process: logical, linear, organized , goal-directed  - Preoccupations: no ruminations, rituals, or phobias appreciated  - Delusions: no persecutory, paranoid, or grandiose delusions appreciated  - Perception: denies AVH, not actively responding to internal stimuli  - SI/HI: denies/denies  Sensorium & Intellect:  - Sensorium: AAOx4  - Memory: intact to recent and remote events  - Attention/Concentration: good/good  - Insight/Judgement: good/good    Gait: normal swing and stance  MSK:no rigidity appreciated    All other systems without acute issues unless noted in HPI      Assessment/Plan      ICD-10-CM ICD-9-CM    1. Moderate episode of recurrent major depressive disorder  F33.1 296.32 amitriptyline (ELAVIL) 150 MG Tab      topiramate (TOPAMAX) 100 MG tablet      2. Generalized anxiety disorder  F41.1 300.02 Drug Analysis W/ Reported Meds, Urine      clonazePAM (KLONOPIN) 1 MG tablet      3. Insomnia, unspecified type  G47.00 780.52       4. Tobacco user  Z72.0 305.1       5. Long term current use of antipsychotic medication  Z79.899 V58.69 CBC Auto Differential      Comprehensive Metabolic Panel      Hemoglobin A1C      Lipid Panel      TSH      Hemoglobin A1C      Drug Analysis W/ Reported Meds, Urine         Continue current medications without change    Potential side effects and risks vs benefits of current treatment plan reviewed with patient. Applicable black box warnings reviewed. Encouraged patient not to alter dosages or abruptly discontinue medications without contacting prescriber first, due to risk of worsening symptoms and decompensation of mental status. Warned of risks associated with herbal remedies and supplements while taking psychotropic medications and of the need to consult prescriber prior to adding any " of these to current regimen. Patient should abstain from abuse of alcohol, prescription medications, and illicit drugs. Reviewed when to contact clinic and/or seek emergent care, such as but not limited to, onset/worsening SI/HI, hallucinations, delusions, manic symptoms. Pt verbalized understanding and agreement of these warnings/recommendations and verbally consented to treatment plan.    SGA labs ordered, patient will complete when financially able to do so.    Follow up in about 4 weeks (around 9/17/2024) for Medication Management.    Maynor Charles, PAULETTEP

## 2024-09-17 DIAGNOSIS — F41.1 GENERALIZED ANXIETY DISORDER: ICD-10-CM

## 2024-09-17 RX ORDER — CLONAZEPAM 1 MG/1
TABLET ORAL
Qty: 15 TABLET | Refills: 0 | Status: SHIPPED | OUTPATIENT
Start: 2024-09-17

## 2024-10-09 ENCOUNTER — TELEPHONE (OUTPATIENT)
Dept: FAMILY MEDICINE | Facility: CLINIC | Age: 49
End: 2024-10-09

## 2024-10-15 ENCOUNTER — OFFICE VISIT (OUTPATIENT)
Dept: BEHAVIORAL HEALTH | Facility: CLINIC | Age: 49
End: 2024-10-15

## 2024-10-15 VITALS
WEIGHT: 201.94 LBS | SYSTOLIC BLOOD PRESSURE: 136 MMHG | HEART RATE: 99 BPM | BODY MASS INDEX: 39.65 KG/M2 | OXYGEN SATURATION: 100 % | HEIGHT: 60 IN | TEMPERATURE: 97 F | DIASTOLIC BLOOD PRESSURE: 88 MMHG

## 2024-10-15 DIAGNOSIS — G47.00 INSOMNIA, UNSPECIFIED TYPE: ICD-10-CM

## 2024-10-15 DIAGNOSIS — F41.1 GENERALIZED ANXIETY DISORDER: ICD-10-CM

## 2024-10-15 DIAGNOSIS — Z72.0 TOBACCO USER: ICD-10-CM

## 2024-10-15 DIAGNOSIS — F33.1 MODERATE EPISODE OF RECURRENT MAJOR DEPRESSIVE DISORDER: Primary | ICD-10-CM

## 2024-10-15 PROCEDURE — 99214 OFFICE O/P EST MOD 30 MIN: CPT | Mod: ,,, | Performed by: NURSE PRACTITIONER

## 2024-10-15 RX ORDER — BREXPIPRAZOLE 1 MG/1
1 TABLET ORAL DAILY
Qty: 30 TABLET | Refills: 1 | Status: SHIPPED | OUTPATIENT
Start: 2024-10-15

## 2024-10-15 RX ORDER — TOPIRAMATE 100 MG/1
100 TABLET, FILM COATED ORAL DAILY
Qty: 30 TABLET | Refills: 1 | Status: SHIPPED | OUTPATIENT
Start: 2024-10-15

## 2024-10-15 RX ORDER — AMITRIPTYLINE HYDROCHLORIDE 150 MG/1
150 TABLET ORAL NIGHTLY
Qty: 30 TABLET | Refills: 1 | Status: SHIPPED | OUTPATIENT
Start: 2024-10-15

## 2024-10-15 RX ORDER — CLONAZEPAM 1 MG/1
1 TABLET ORAL DAILY PRN
Qty: 15 TABLET | Refills: 0 | Status: SHIPPED | OUTPATIENT
Start: 2024-10-15

## 2024-10-15 NOTE — PROGRESS NOTES
PSYCHIATRIC FOLLOW-UP VISIT NOTE    Chief Complaint   Patient presents with    Medication Management     Medication management         History of Present Illness  49 y.o. year old White female with hx of MDD, OLIMPIA, insomnia, and tobacco use seen today for follow-up appointment and medication management.  Patient reports that she has been doing very well since last visit.  Denies any recent depression or anxiety.  Reports good mood stability.  No recent outbursts, increased impulsivity, or elevated mood.  She was not been irritable.  Sleeping well at night and feels rested in the morning.  Denies any side effects from current medication regimen.  She was able to set healthy boundaries with her sister and her mother and feels that her mental state has been much better since she took this step. Patient denies SI/HI. Denies hallucinations and does not appear to be responding to internal stimuli or be internally preoccupied. No manic symptoms noted. Tolerating SGA therapy without serious side effects. No NMS s/s. No EPS or TD symptoms noted. AIMS=0.          Objective:     Vitals:  Vitals:    10/15/24 0703   BP: 136/88   Pulse: 99   Temp: 97 °F (36.1 °C)   TempSrc: Temporal   SpO2: 100%   Weight: 91.6 kg (201 lb 15.1 oz)   Height: 5' (1.524 m)       Wt Readings from Last 3 Encounters:   10/15/24 0703 91.6 kg (201 lb 15.1 oz)   08/20/24 0709 89.5 kg (197 lb 5 oz)   07/03/24 0714 87.9 kg (193 lb 12.6 oz)         Medication:    Current Outpatient Medications:     albuterol (PROVENTIL/VENTOLIN HFA) 90 mcg/actuation inhaler, Inhale 1 puff into the lungs 4 (four) times daily as needed., Disp: , Rfl:     ferrous sulfate (FEOSOL) 325 mg (65 mg iron) Tab tablet, Take 1 tablet (325 mg total) by mouth every other day., Disp: 45 tablet, Rfl: 3    gabapentin (NEURONTIN) 600 MG tablet, TAKE ONE(1) TAB BY MOUTH ONCE A DAY WITH FOOD AS NEEDED FOR PAIN, Disp: 30 tablet, Rfl: 11    pantoprazole (PROTONIX) 40 MG tablet, TAKE ONE(1) TAB BY  "MOUTH ONCE A DAY FOR STOMACH &/OR REFLUX /DO NOT CRUSH OR CHEW, Disp: 30 tablet, Rfl: 5    amitriptyline (ELAVIL) 150 MG Tab, Take 1 tablet (150 mg total) by mouth every evening., Disp: 30 tablet, Rfl: 1    brexpiprazole (REXULTI) 1 mg Tab, Take 1 tablet (1 mg total) by mouth once daily., Disp: 30 tablet, Rfl: 1    clonazePAM (KLONOPIN) 1 MG tablet, Take 1 tablet (1 mg total) by mouth daily as needed for Anxiety., Disp: 15 tablet, Rfl: 0    topiramate (TOPAMAX) 100 MG tablet, Take 1 tablet (100 mg total) by mouth once daily., Disp: 30 tablet, Rfl: 1       Significant Labs: - none at this time    Significant Imaging: - none at this time    Physical Exam  Vitals and nursing note reviewed.   Constitutional:       General: She is awake.      Appearance: Normal appearance.   Musculoskeletal:      Comments: Full ROM   Neurological:      Mental Status: She is alert.   Psychiatric:         Attention and Perception: Attention and perception normal. She does not perceive auditory or visual hallucinations.         Mood and Affect: Affect normal.         Speech: Speech normal.         Behavior: Behavior is cooperative.         Thought Content: Thought content does not include homicidal or suicidal ideation.         Cognition and Memory: Cognition and memory normal.          Review of Systems     Mental Status Exam:  Presentation:  - Appearance: 49 y.o. year old White female, appears stated age, appears Casually dressed and Well groomed  - Motility: Erect when standing, Steady gait, No EPS or Tremors, No psychomotor agitation or retardation appreciated  - Behavior: calm, cooperative, good eye contact  Speech:  - Character/Organization: spontaneous, fluent, normal volume, normal rate, normal rhythm  Emotional State:  - Mood: "happy"   - Affect: congruent and appropriate  Thought:  - Process: logical, linear, organized , goal-directed  - Preoccupations: no ruminations, rituals, or phobias appreciated  - Delusions: no persecutory, " paranoid, or grandiose delusions appreciated  - Perception: denies AVH, not actively responding to internal stimuli  - SI/HI: denies/denies  Sensorium & Intellect:  - Sensorium: AAOx4  - Memory: intact to recent and remote events  - Attention/Concentration: good/good  - Insight/Judgement: good/good    Gait: normal swing and stance  MSK:no rigidity appreciated    All other systems without acute issues unless noted in HPI      Assessment/Plan      ICD-10-CM ICD-9-CM    1. Moderate episode of recurrent major depressive disorder  F33.1 296.32 amitriptyline (ELAVIL) 150 MG Tab      brexpiprazole (REXULTI) 1 mg Tab      topiramate (TOPAMAX) 100 MG tablet      2. Generalized anxiety disorder  F41.1 300.02 clonazePAM (KLONOPIN) 1 MG tablet      3. Insomnia, unspecified type  G47.00 780.52       4. Tobacco user  Z72.0 305.1          Continue current medications without change     checked. Results as expected. No suspected diversion or abuse of controlled substances.    Potential side effects and risks vs benefits of current treatment plan reviewed with patient. Applicable black box warnings reviewed. Encouraged patient not to alter dosages or abruptly discontinue medications without contacting prescriber first, due to risk of worsening symptoms and decompensation of mental status. Warned of risks associated with herbal remedies and supplements while taking psychotropic medications and of the need to consult prescriber prior to adding any of these to current regimen. Patient should abstain from abuse of alcohol, prescription medications, and illicit drugs. Reviewed when to contact clinic and/or seek emergent care, such as but not limited to, onset/worsening SI/HI, hallucinations, delusions, manic symptoms. Pt verbalized understanding and agreement of these warnings/recommendations and verbally consented to treatment plan.      Follow up in about 4 weeks (around 11/12/2024) for Medication Management.    Maynor  PAULETTE CharlesP

## 2024-10-17 ENCOUNTER — LAB VISIT (OUTPATIENT)
Dept: LAB | Facility: HOSPITAL | Age: 49
End: 2024-10-17
Attending: NURSE PRACTITIONER

## 2024-10-17 ENCOUNTER — TELEPHONE (OUTPATIENT)
Dept: BEHAVIORAL HEALTH | Facility: CLINIC | Age: 49
End: 2024-10-17

## 2024-10-17 DIAGNOSIS — Z79.899 LONG TERM CURRENT USE OF ANTIPSYCHOTIC MEDICATION: ICD-10-CM

## 2024-10-17 LAB
ALBUMIN SERPL-MCNC: 4.4 G/DL (ref 3.4–5)
ALBUMIN/GLOB SERPL: 1.6 RATIO
ALP SERPL-CCNC: 106 UNIT/L (ref 50–144)
ALT SERPL-CCNC: 30 UNIT/L (ref 1–45)
ANION GAP SERPL CALC-SCNC: 9 MEQ/L (ref 2–13)
AST SERPL-CCNC: 30 UNIT/L (ref 14–36)
BASOPHILS # BLD AUTO: 0.04 X10(3)/MCL (ref 0.01–0.08)
BASOPHILS NFR BLD AUTO: 0.4 % (ref 0.1–1.2)
BILIRUB SERPL-MCNC: 0.2 MG/DL (ref 0–1)
BUN SERPL-MCNC: 10 MG/DL (ref 7–20)
CALCIUM SERPL-MCNC: 9.6 MG/DL (ref 8.4–10.2)
CHLORIDE SERPL-SCNC: 108 MMOL/L (ref 98–110)
CHOLEST SERPL-MCNC: 224 MG/DL (ref 0–200)
CO2 SERPL-SCNC: 22 MMOL/L (ref 21–32)
CREAT SERPL-MCNC: 1.23 MG/DL (ref 0.66–1.25)
CREAT/UREA NIT SERPL: 8 (ref 12–20)
EOSINOPHIL # BLD AUTO: 0.12 X10(3)/MCL (ref 0.04–0.36)
EOSINOPHIL NFR BLD AUTO: 1.3 % (ref 0.7–7)
ERYTHROCYTE [DISTWIDTH] IN BLOOD BY AUTOMATED COUNT: 13.5 % (ref 11–14.5)
GFR SERPLBLD CREATININE-BSD FMLA CKD-EPI: 55 ML/MIN/1.73/M2
GLOBULIN SER-MCNC: 2.7 GM/DL (ref 2–3.9)
GLUCOSE SERPL-MCNC: 96 MG/DL (ref 70–115)
HCT VFR BLD AUTO: 41.4 % (ref 36–48)
HDLC SERPL-MCNC: 53 MG/DL (ref 40–60)
HGB BLD-MCNC: 14.1 G/DL (ref 11.8–16)
IMM GRANULOCYTES # BLD AUTO: 0.19 X10(3)/MCL (ref 0–0.03)
IMM GRANULOCYTES NFR BLD AUTO: 2 % (ref 0–0.5)
LDLC SERPL DIRECT ASSAY-SCNC: 132.3 MG/DL (ref 30–100)
LYMPHOCYTES # BLD AUTO: 1.75 X10(3)/MCL (ref 1.16–3.74)
LYMPHOCYTES NFR BLD AUTO: 18.6 % (ref 20–55)
MCH RBC QN AUTO: 32.3 PG (ref 27–34)
MCHC RBC AUTO-ENTMCNC: 34.1 G/DL (ref 31–37)
MCV RBC AUTO: 94.7 FL (ref 79–99)
MONOCYTES # BLD AUTO: 0.47 X10(3)/MCL (ref 0.24–0.36)
MONOCYTES NFR BLD AUTO: 5 % (ref 4.7–12.5)
NEUTROPHILS # BLD AUTO: 6.85 X10(3)/MCL (ref 1.56–6.13)
NEUTROPHILS NFR BLD AUTO: 72.7 % (ref 37–73)
NRBC BLD AUTO-RTO: 0 %
PLATELET # BLD AUTO: 287 X10(3)/MCL (ref 140–371)
PMV BLD AUTO: 8.3 FL (ref 9.4–12.4)
POTASSIUM SERPL-SCNC: 4.2 MMOL/L (ref 3.5–5.1)
PROT SERPL-MCNC: 7.1 GM/DL (ref 6.3–8.2)
RBC # BLD AUTO: 4.37 X10(6)/MCL (ref 4–5.1)
SODIUM SERPL-SCNC: 139 MMOL/L (ref 136–145)
TRIGL SERPL-MCNC: 134 MG/DL (ref 30–200)
TSH SERPL-ACNC: 1.56 UIU/ML (ref 0.36–3.74)
WBC # BLD AUTO: 9.42 X10(3)/MCL (ref 4–11.5)

## 2024-10-17 PROCEDURE — 85025 COMPLETE CBC W/AUTO DIFF WBC: CPT

## 2024-10-17 PROCEDURE — 36415 COLL VENOUS BLD VENIPUNCTURE: CPT

## 2024-10-17 PROCEDURE — 80053 COMPREHEN METABOLIC PANEL: CPT

## 2024-10-17 PROCEDURE — 80061 LIPID PANEL: CPT

## 2024-10-17 PROCEDURE — 84443 ASSAY THYROID STIM HORMONE: CPT

## 2024-10-17 NOTE — TELEPHONE ENCOUNTER
----- Message from DANIKA Varela sent at 10/17/2024 11:33 AM CDT -----  TSH and A1c WNL. Elevated cholesterol, levels similar to results 1 year ago. CMP largely WNL aside from low BUN. CBC with abnormalities similar to last year's CBC. Can follow-up with PCP for further evaluation. No medication changes necessary at pres  ent

## 2024-10-17 NOTE — PROGRESS NOTES
TSH and A1c WNL. Elevated cholesterol, levels similar to results 1 year ago. CMP largely WNL aside from low BUN. CBC with abnormalities similar to last year's CBC. Can follow-up with PCP for further evaluation. No medication changes necessary at present

## 2024-10-28 ENCOUNTER — TELEPHONE (OUTPATIENT)
Dept: FAMILY MEDICINE | Facility: CLINIC | Age: 49
End: 2024-10-28

## 2024-10-28 DIAGNOSIS — K21.9 GASTROESOPHAGEAL REFLUX DISEASE, UNSPECIFIED WHETHER ESOPHAGITIS PRESENT: ICD-10-CM

## 2024-10-28 RX ORDER — PANTOPRAZOLE SODIUM 40 MG/1
40 TABLET, DELAYED RELEASE ORAL DAILY
Qty: 30 TABLET | Refills: 0 | Status: SHIPPED | OUTPATIENT
Start: 2024-10-28 | End: 2024-10-30 | Stop reason: SDUPTHER

## 2024-10-30 ENCOUNTER — OFFICE VISIT (OUTPATIENT)
Dept: FAMILY MEDICINE | Facility: CLINIC | Age: 49
End: 2024-10-30

## 2024-10-30 ENCOUNTER — TELEPHONE (OUTPATIENT)
Dept: FAMILY MEDICINE | Facility: CLINIC | Age: 49
End: 2024-10-30

## 2024-10-30 VITALS
SYSTOLIC BLOOD PRESSURE: 138 MMHG | HEIGHT: 60 IN | OXYGEN SATURATION: 98 % | TEMPERATURE: 98 F | HEART RATE: 104 BPM | BODY MASS INDEX: 39.46 KG/M2 | WEIGHT: 201 LBS | DIASTOLIC BLOOD PRESSURE: 80 MMHG

## 2024-10-30 DIAGNOSIS — D50.8 OTHER IRON DEFICIENCY ANEMIA: ICD-10-CM

## 2024-10-30 DIAGNOSIS — Z72.0 TOBACCO USER: ICD-10-CM

## 2024-10-30 DIAGNOSIS — F32.A DEPRESSIVE DISORDER: ICD-10-CM

## 2024-10-30 DIAGNOSIS — F41.1 GENERALIZED ANXIETY DISORDER: ICD-10-CM

## 2024-10-30 DIAGNOSIS — Z00.00 ENCOUNTER FOR WELL ADULT EXAM WITHOUT ABNORMAL FINDINGS: Primary | ICD-10-CM

## 2024-10-30 DIAGNOSIS — K21.9 GASTROESOPHAGEAL REFLUX DISEASE, UNSPECIFIED WHETHER ESOPHAGITIS PRESENT: ICD-10-CM

## 2024-10-30 DIAGNOSIS — J40 BRONCHITIS: ICD-10-CM

## 2024-10-30 DIAGNOSIS — M54.12 CHRONIC CERVICAL RADICULOPATHY: ICD-10-CM

## 2024-10-30 DIAGNOSIS — E66.812 CLASS 2 OBESITY DUE TO EXCESS CALORIES WITHOUT SERIOUS COMORBIDITY WITH BODY MASS INDEX (BMI) OF 39.0 TO 39.9 IN ADULT: ICD-10-CM

## 2024-10-30 DIAGNOSIS — E66.09 CLASS 2 OBESITY DUE TO EXCESS CALORIES WITHOUT SERIOUS COMORBIDITY WITH BODY MASS INDEX (BMI) OF 39.0 TO 39.9 IN ADULT: ICD-10-CM

## 2024-10-30 PROBLEM — N28.9 RENAL INSUFFICIENCY: Status: RESOLVED | Noted: 2023-08-28 | Resolved: 2024-10-30

## 2024-10-30 PROBLEM — F33.1 MODERATE EPISODE OF RECURRENT MAJOR DEPRESSIVE DISORDER: Status: RESOLVED | Noted: 2023-02-06 | Resolved: 2024-10-30

## 2024-10-30 PROCEDURE — 99396 PREV VISIT EST AGE 40-64: CPT | Mod: 25,,, | Performed by: NURSE PRACTITIONER

## 2024-10-30 PROCEDURE — 94640 AIRWAY INHALATION TREATMENT: CPT | Mod: ,,, | Performed by: NURSE PRACTITIONER

## 2024-10-30 RX ORDER — ALBUTEROL SULFATE 0.83 MG/ML
2.5 SOLUTION RESPIRATORY (INHALATION) EVERY 6 HOURS PRN
Qty: 30 EACH | Refills: 0 | Status: SHIPPED | OUTPATIENT
Start: 2024-10-30 | End: 2025-10-30

## 2024-10-30 RX ORDER — PREDNISONE 20 MG/1
20 TABLET ORAL 2 TIMES DAILY
Qty: 10 TABLET | Refills: 0 | Status: SHIPPED | OUTPATIENT
Start: 2024-10-30 | End: 2024-11-04

## 2024-10-30 RX ORDER — PANTOPRAZOLE SODIUM 40 MG/1
40 TABLET, DELAYED RELEASE ORAL DAILY
Qty: 30 TABLET | Refills: 11 | Status: SHIPPED | OUTPATIENT
Start: 2024-10-30

## 2024-10-30 RX ORDER — DOXYCYCLINE 100 MG/1
100 CAPSULE ORAL 2 TIMES DAILY
Qty: 20 CAPSULE | Refills: 0 | Status: SHIPPED | OUTPATIENT
Start: 2024-10-30 | End: 2024-11-09

## 2024-10-30 RX ORDER — ALBUTEROL SULFATE 0.83 MG/ML
2.5 SOLUTION RESPIRATORY (INHALATION)
Status: COMPLETED | OUTPATIENT
Start: 2024-10-30 | End: 2024-10-30

## 2024-10-30 RX ADMIN — ALBUTEROL SULFATE 2.5 MG: 0.83 SOLUTION RESPIRATORY (INHALATION) at 04:10

## 2024-12-02 ENCOUNTER — TELEPHONE (OUTPATIENT)
Dept: FAMILY MEDICINE | Facility: CLINIC | Age: 49
End: 2024-12-02

## 2024-12-02 NOTE — TELEPHONE ENCOUNTER
Medication was discontinued at her last visit with Ayde in Oct. She was informed of this on 11/19/24.  No refill was given.    Called and informed pt about medication being discontinued and not being able to refill .

## 2024-12-16 ENCOUNTER — OFFICE VISIT (OUTPATIENT)
Dept: BEHAVIORAL HEALTH | Facility: CLINIC | Age: 49
End: 2024-12-16

## 2024-12-16 VITALS
WEIGHT: 204.81 LBS | BODY MASS INDEX: 40.21 KG/M2 | OXYGEN SATURATION: 97 % | TEMPERATURE: 97 F | HEIGHT: 60 IN | SYSTOLIC BLOOD PRESSURE: 128 MMHG | HEART RATE: 108 BPM | DIASTOLIC BLOOD PRESSURE: 76 MMHG

## 2024-12-16 DIAGNOSIS — F33.1 MODERATE EPISODE OF RECURRENT MAJOR DEPRESSIVE DISORDER: Primary | ICD-10-CM

## 2024-12-16 DIAGNOSIS — G47.00 INSOMNIA, UNSPECIFIED TYPE: ICD-10-CM

## 2024-12-16 DIAGNOSIS — F41.1 GENERALIZED ANXIETY DISORDER: ICD-10-CM

## 2024-12-16 DIAGNOSIS — Z72.0 TOBACCO USER: ICD-10-CM

## 2024-12-16 PROCEDURE — 99214 OFFICE O/P EST MOD 30 MIN: CPT | Mod: ,,, | Performed by: NURSE PRACTITIONER

## 2024-12-16 RX ORDER — CLONAZEPAM 1 MG/1
1 TABLET ORAL DAILY PRN
Qty: 30 TABLET | Refills: 1 | Status: SHIPPED | OUTPATIENT
Start: 2024-12-16

## 2024-12-16 RX ORDER — TOPIRAMATE 100 MG/1
100 TABLET, FILM COATED ORAL 2 TIMES DAILY
Qty: 60 TABLET | Refills: 1 | Status: SHIPPED | OUTPATIENT
Start: 2024-12-16

## 2024-12-16 RX ORDER — BREXPIPRAZOLE 1 MG/1
1 TABLET ORAL DAILY
COMMUNITY

## 2024-12-16 RX ORDER — TOPIRAMATE 100 MG/1
100 TABLET, FILM COATED ORAL DAILY
COMMUNITY
End: 2024-12-16 | Stop reason: SDUPTHER

## 2024-12-16 RX ORDER — AMITRIPTYLINE HYDROCHLORIDE 150 MG/1
150 TABLET ORAL NIGHTLY
Qty: 30 TABLET | Refills: 1 | Status: SHIPPED | OUTPATIENT
Start: 2024-12-16

## 2024-12-16 NOTE — PROGRESS NOTES
PSYCHIATRIC FOLLOW-UP VISIT NOTE    Chief Complaint   Patient presents with    Medication Management     1 month medication management         History of Present Illness  49 y.o. year old White female with hx of MDD, OLIMPIA, insomnia, and tobacco use seen today for follow-up appointment and medication management.  Patient reports that she has been doing well on current medication regimen.  Has been out of her clonazepam which has caused some increased anxiety, especially due to increased family discord surrounding the holiday season.  Still has limited contact with her mother and 1 of her sisters.  Finds that she is very overwhelmed and mildly irritable at times.  She is also frustrated with continued weight gain.  She is sleeping well at night and feels rested in the morning.  Attending to responsibilities at work and home without significant difficulty at present.  Adamantly denies any thoughts of harming self or others.  We agreed to increase her Topamax to target medication associated weight gain.  We will likely discontinue Rexulti at next visit if weight gain continues.  Patient verbalized understanding and agreement with treatment plan. Patient denies SI/HI. Denies hallucinations and does not appear to be responding to internal stimuli or be internally preoccupied. No manic symptoms noted. Tolerating SGA therapy without serious side effects. No NMS s/s. No EPS or TD symptoms noted. AIMS=0.        Objective:     Vitals:  Vitals:    12/16/24 0702   BP: 128/76   BP Location: Right arm   Patient Position: Sitting   Pulse: 108   Temp: 97 °F (36.1 °C)   TempSrc: Temporal   SpO2: 97%   Weight: 92.9 kg (204 lb 12.9 oz)   Height: 5' (1.524 m)       Wt Readings from Last 3 Encounters:   12/16/24 0702 92.9 kg (204 lb 12.9 oz)   10/30/24 1501 91.2 kg (201 lb)   10/15/24 0703 91.6 kg (201 lb 15.1 oz)         Medication:    Current Outpatient Medications:     albuterol (PROVENTIL) 2.5 mg /3 mL (0.083 %) nebulizer solution,  Take 3 mLs (2.5 mg total) by nebulization every 6 (six) hours as needed for Wheezing. Rescue, Disp: 30 each, Rfl: 0    albuterol (PROVENTIL/VENTOLIN HFA) 90 mcg/actuation inhaler, Inhale 1 puff into the lungs 4 (four) times daily as needed., Disp: , Rfl:     brexpiprazole (REXULTI) 1 mg Tab, Take 1 mg by mouth once daily., Disp: , Rfl:     ferrous sulfate (FEOSOL) 325 mg (65 mg iron) Tab tablet, Take 1 tablet (325 mg total) by mouth every other day., Disp: 45 tablet, Rfl: 3    pantoprazole (PROTONIX) 40 MG tablet, Take 1 tablet (40 mg total) by mouth once daily., Disp: 30 tablet, Rfl: 11    amitriptyline (ELAVIL) 150 MG Tab, Take 1 tablet (150 mg total) by mouth every evening., Disp: 30 tablet, Rfl: 1    clonazePAM (KLONOPIN) 1 MG tablet, Take 1 tablet (1 mg total) by mouth daily as needed for Anxiety., Disp: 30 tablet, Rfl: 1    topiramate (TOPAMAX) 100 MG tablet, Take 1 tablet (100 mg total) by mouth 2 (two) times daily., Disp: 60 tablet, Rfl: 1       Significant Labs: - none at this time    Significant Imaging: - none at this time    Physical Exam  Vitals and nursing note reviewed.   Constitutional:       General: She is awake.      Appearance: Normal appearance.   Musculoskeletal:      Comments: Full ROM   Neurological:      Mental Status: She is alert.   Psychiatric:         Attention and Perception: Attention and perception normal. She does not perceive auditory or visual hallucinations.         Mood and Affect: Affect normal. Mood is anxious.         Speech: Speech normal.         Behavior: Behavior is cooperative.         Thought Content: Thought content does not include homicidal or suicidal ideation.         Cognition and Memory: Cognition and memory normal.         Judgment: Judgment normal.      Comments: Anxious affect          Review of Systems     Mental Status Exam:  Presentation:  - Appearance: 49 y.o. year old White female, appears stated age, appears Casually dressed and Well groomed  - Motility:  "Erect when standing, Steady gait, No EPS or Tremors, No psychomotor agitation or retardation appreciated  - Behavior: calm, cooperative, good eye contact  Speech:  - Character/Organization: spontaneous, fluent, normal volume, normal rate, normal rhythm  Emotional State:  - Mood: "worried"   - Affect: congruent and anxious  Thought:  - Process: logical, linear, organized , goal-directed  - Preoccupations: family  - Delusions: no persecutory, paranoid, or grandiose delusions appreciated  - Perception: denies AVH, not actively responding to internal stimuli  - SI/HI: denies/denies  Sensorium & Intellect:  - Sensorium: AAOx4  - Memory: intact to recent and remote events  - Attention/Concentration: good/good  - Insight/Judgement: good/good    Gait: normal swing and stance  MSK:no rigidity appreciated    All other systems without acute issues unless noted in HPI      Assessment/Plan      ICD-10-CM ICD-9-CM    1. Moderate episode of recurrent major depressive disorder  F33.1 296.32       2. Generalized anxiety disorder  F41.1 300.02 clonazePAM (KLONOPIN) 1 MG tablet      3. Insomnia, unspecified type  G47.00 780.52 amitriptyline (ELAVIL) 150 MG Tab      4. Tobacco user  Z72.0 305.1          Increase Topamax to 100 mg twice daily    Continue other medications without change    Potential side effects and risks vs benefits of current treatment plan reviewed with patient. Applicable black box warnings reviewed. Encouraged patient not to alter dosages or abruptly discontinue medications without contacting prescriber first, due to risk of worsening symptoms and decompensation of mental status. Warned of risks associated with herbal remedies and supplements while taking psychotropic medications and of the need to consult prescriber prior to adding any of these to current regimen. Patient should abstain from abuse of alcohol, prescription medications, and illicit drugs. Reviewed when to contact clinic and/or seek emergent care, " such as but not limited to, onset/worsening SI/HI, hallucinations, delusions, manic symptoms. Pt verbalized understanding and agreement of these warnings/recommendations and verbally consented to treatment plan.     checked. Results as expected. No suspected diversion or abuse of controlled substances.      Follow up in about 2 months (around 2/16/2025) for Medication Management.        Maynor Charles, PAULETTEP

## 2025-01-13 DIAGNOSIS — M54.12 CHRONIC CERVICAL RADICULOPATHY: Primary | ICD-10-CM

## 2025-01-13 RX ORDER — GABAPENTIN 600 MG/1
TABLET ORAL
Qty: 30 TABLET | Refills: 11 | Status: SHIPPED | OUTPATIENT
Start: 2025-01-13

## 2025-02-05 DIAGNOSIS — Z72.0 TOBACCO USER: Primary | ICD-10-CM

## 2025-02-05 DIAGNOSIS — G25.81 RESTLESS LEG: ICD-10-CM

## 2025-02-05 RX ORDER — TOPIRAMATE 100 MG/1
TABLET, FILM COATED ORAL
Qty: 60 TABLET | Refills: 1 | Status: SHIPPED | OUTPATIENT
Start: 2025-02-05

## 2025-02-13 ENCOUNTER — OFFICE VISIT (OUTPATIENT)
Dept: BEHAVIORAL HEALTH | Facility: CLINIC | Age: 50
End: 2025-02-13

## 2025-02-13 VITALS
TEMPERATURE: 98 F | HEART RATE: 95 BPM | BODY MASS INDEX: 40.34 KG/M2 | DIASTOLIC BLOOD PRESSURE: 76 MMHG | HEIGHT: 60 IN | OXYGEN SATURATION: 99 % | WEIGHT: 205.5 LBS | SYSTOLIC BLOOD PRESSURE: 112 MMHG

## 2025-02-13 DIAGNOSIS — Z72.0 TOBACCO USER: ICD-10-CM

## 2025-02-13 DIAGNOSIS — F33.1 MODERATE EPISODE OF RECURRENT MAJOR DEPRESSIVE DISORDER: Primary | ICD-10-CM

## 2025-02-13 DIAGNOSIS — Z79.899 LONG-TERM CURRENT USE OF BENZODIAZEPINE: ICD-10-CM

## 2025-02-13 DIAGNOSIS — G25.81 RESTLESS LEG: ICD-10-CM

## 2025-02-13 DIAGNOSIS — F41.1 GENERALIZED ANXIETY DISORDER: ICD-10-CM

## 2025-02-13 DIAGNOSIS — G47.00 INSOMNIA, UNSPECIFIED TYPE: ICD-10-CM

## 2025-02-13 LAB
AMP AMPHETAMINE 1000 NM/ML POC: NEGATIVE
BAR BARBITURATES 300 NG/ML POC: NEGATIVE
BUP BUPRENORPHINE 10 NG/ML POC: NEGATIVE
BZO BENZODIAZEPINES 300 NG/ML POC: NEGATIVE
COC COCAINE 300 NG/ML POC: NEGATIVE
CREATININE (CR) POC: ABNORMAL
CTP QC/QA: YES
MET METHAMPHETAMINE 1000 NG/ML POC: NEGATIVE
MOP/OPI300 MORPHINE 300 NG/ML POC: NEGATIVE
MTD METHADONE 300 NG/ML POC: NEGATIVE
OXIDANT (OX) POC: ABNORMAL
OXY OXYCODONE 100 NG/ML POC: NEGATIVE
SPECIFIC GRAVITY (SG) POC: ABNORMAL
TEMPERATURE (°F) POC: 98
THC MARIJUANA 50 NG/ML POC: ABNORMAL

## 2025-02-13 RX ORDER — AMITRIPTYLINE HYDROCHLORIDE 150 MG/1
150 TABLET ORAL NIGHTLY
Qty: 30 TABLET | Refills: 2 | Status: SHIPPED | OUTPATIENT
Start: 2025-02-13

## 2025-02-13 RX ORDER — CLONAZEPAM 1 MG/1
1 TABLET ORAL DAILY PRN
Qty: 30 TABLET | Refills: 2 | Status: SHIPPED | OUTPATIENT
Start: 2025-02-13

## 2025-02-13 NOTE — PROGRESS NOTES
PSYCHIATRIC FOLLOW-UP VISIT NOTE    Chief Complaint   Patient presents with    Medication Management     2 month medication management         History of Present Illness  50 y.o. year old White female with hx of MDD, neeta, insomnia, tobacco use, and long-term use of benzodiazepine seen today for follow-up appointment and medication management.  Patient has not taken her Rexulti for about 2 months after not receiving it following our last visit.  She did not contact the clinic regarding this missing medication.  Has continued with Elavil and clonazepam as needed.  She also recently obtain a medical marijuana card.  This is not reflected on her , but patient did pull up receipt which indicated fill date of January 17th.  She reports minimal depression and anxiety recently.  She is coping with day-to-day stressors effectively.  Recently began exercising.  Denies side effects from current medication. Patient denies SI/HI. Denies hallucinations and does not appear to be responding to internal stimuli or be internally preoccupied. No manic symptoms noted.       Objective:     Vitals:  Vitals:    02/13/25 0709   BP: 112/76   BP Location: Right arm   Patient Position: Sitting   Pulse: 95   Temp: 98.1 °F (36.7 °C)   TempSrc: Temporal   SpO2: 99%   Weight: 93.2 kg (205 lb 7.5 oz)   Height: 5' (1.524 m)       Wt Readings from Last 3 Encounters:   02/13/25 0709 93.2 kg (205 lb 7.5 oz)   12/16/24 0702 92.9 kg (204 lb 12.9 oz)   10/30/24 1501 91.2 kg (201 lb)         Medication:    Current Outpatient Medications:     albuterol (PROVENTIL) 2.5 mg /3 mL (0.083 %) nebulizer solution, Take 3 mLs (2.5 mg total) by nebulization every 6 (six) hours as needed for Wheezing. Rescue, Disp: 30 each, Rfl: 0    albuterol (PROVENTIL/VENTOLIN HFA) 90 mcg/actuation inhaler, Inhale 1 puff into the lungs 4 (four) times daily as needed., Disp: , Rfl:     ferrous sulfate (FEOSOL) 325 mg (65 mg iron) Tab tablet, Take 1 tablet (325 mg total) by mouth  "every other day., Disp: 45 tablet, Rfl: 3    gabapentin (NEURONTIN) 600 MG tablet, TAKE ONE(1) TAB BY MOUTH ONCE A DAY WITH FOOD AS NEEDED FOR PAIN, Disp: 30 tablet, Rfl: 11    pantoprazole (PROTONIX) 40 MG tablet, Take 1 tablet (40 mg total) by mouth once daily., Disp: 30 tablet, Rfl: 11    topiramate (TOPAMAX) 100 MG tablet, TAKE ONE(1) TAB BY MOUTH TWICE DAILY WITH FULL GLASS OF WATER TO HELP PREVENT HEADACHE &/OR MOOD, Disp: 60 tablet, Rfl: 1    amitriptyline (ELAVIL) 150 MG Tab, Take 1 tablet (150 mg total) by mouth every evening., Disp: 30 tablet, Rfl: 2    clonazePAM (KLONOPIN) 1 MG tablet, Take 1 tablet (1 mg total) by mouth daily as needed for Anxiety., Disp: 30 tablet, Rfl: 2       Significant Labs: - UDS in office positive for THC    Significant Imaging: - none at this time    Physical Exam  Vitals and nursing note reviewed.   Constitutional:       General: She is awake.      Appearance: Normal appearance.   Musculoskeletal:      Comments: Full ROM   Neurological:      Mental Status: She is alert.   Psychiatric:         Attention and Perception: Attention and perception normal. She does not perceive auditory or visual hallucinations.         Mood and Affect: Affect normal.         Speech: Speech normal.         Behavior: Behavior is cooperative.         Thought Content: Thought content does not include homicidal or suicidal ideation.         Cognition and Memory: Cognition and memory normal.          Review of Systems     Mental Status Exam:  Presentation:  - Appearance: 50 y.o. year old White female, appears stated age, appears Casually dressed and Well groomed  - Motility: Erect when standing, Steady gait, No EPS or Tremors, No psychomotor agitation or retardation appreciated  - Behavior: calm, cooperative, good eye contact  Speech:  - Character/Organization: spontaneous, fluent, normal volume, normal rate, normal rhythm  Emotional State:  - Mood: "happier"   - Affect: congruent and " appropriate  Thought:  - Process: logical, linear, organized , goal-directed  - Preoccupations: no ruminations, rituals, or phobias appreciated  - Delusions: no persecutory, paranoid, or grandiose delusions appreciated  - Perception: denies AVH, not actively responding to internal stimuli  - SI/HI: denies/denies  Sensorium & Intellect:  - Sensorium: AAOx4  - Memory: intact to recent and remote events  - Attention/Concentration: good/good  - Insight/Judgement: good/good    Gait: normal swing and stance  MSK:no rigidity appreciated    All other systems without acute issues unless noted in HPI      Assessment/Plan      ICD-10-CM ICD-9-CM    1. Moderate episode of recurrent major depressive disorder  F33.1 296.32       2. Generalized anxiety disorder  F41.1 300.02 clonazePAM (KLONOPIN) 1 MG tablet      3. Insomnia, unspecified type  G47.00 780.52 amitriptyline (ELAVIL) 150 MG Tab      4. Tobacco user  Z72.0 305.1       5. Long-term current use of benzodiazepine  Z79.899 V58.69 POCT Urine Drug Screen (With BUP)      6. Restless leg  G25.81 333.94          Continue current medications without change     checked. Results as expected. No suspected diversion or abuse of controlled substances.    Potential side effects and risks vs benefits of current treatment plan reviewed with patient. Applicable black box warnings reviewed. Encouraged patient not to alter dosages or abruptly discontinue medications without contacting prescriber first, due to risk of worsening symptoms and decompensation of mental status. Warned of risks associated with herbal remedies and supplements while taking psychotropic medications and of the need to consult prescriber prior to adding any of these to current regimen. Patient should abstain from abuse of alcohol, prescription medications, and illicit drugs. Reviewed when to contact clinic and/or seek emergent care, such as but not limited to, onset/worsening SI/HI, hallucinations, delusions, manic  symptoms. Pt verbalized understanding and agreement of these warnings/recommendations and verbally consented to treatment plan.      Follow up in about 3 months (around 5/13/2025) for Medication Management.        PAULETTE VarelaP

## 2025-03-11 ENCOUNTER — TELEPHONE (OUTPATIENT)
Dept: BEHAVIORAL HEALTH | Facility: CLINIC | Age: 50
End: 2025-03-11

## 2025-03-11 NOTE — TELEPHONE ENCOUNTER
Samples of Rexulti 1 mg came in for patient and she was notified. LOT# [id number] EXP-Feb 2027 90 day supply.

## 2025-05-05 DIAGNOSIS — F41.1 GENERALIZED ANXIETY DISORDER: ICD-10-CM

## 2025-05-05 RX ORDER — CLONAZEPAM 1 MG/1
TABLET ORAL
Qty: 30 TABLET | Refills: 0 | Status: SHIPPED | OUTPATIENT
Start: 2025-05-05

## 2025-05-28 ENCOUNTER — OFFICE VISIT (OUTPATIENT)
Dept: BEHAVIORAL HEALTH | Facility: CLINIC | Age: 50
End: 2025-05-28

## 2025-05-28 VITALS
SYSTOLIC BLOOD PRESSURE: 118 MMHG | WEIGHT: 203.5 LBS | TEMPERATURE: 97 F | HEIGHT: 60 IN | HEART RATE: 98 BPM | OXYGEN SATURATION: 99 % | DIASTOLIC BLOOD PRESSURE: 74 MMHG | BODY MASS INDEX: 39.95 KG/M2

## 2025-05-28 DIAGNOSIS — G47.00 INSOMNIA, UNSPECIFIED TYPE: ICD-10-CM

## 2025-05-28 DIAGNOSIS — Z79.899 MEDICAL MARIJUANA USE: ICD-10-CM

## 2025-05-28 DIAGNOSIS — F41.1 GENERALIZED ANXIETY DISORDER: Primary | ICD-10-CM

## 2025-05-28 DIAGNOSIS — F33.1 MODERATE EPISODE OF RECURRENT MAJOR DEPRESSIVE DISORDER: ICD-10-CM

## 2025-05-28 DIAGNOSIS — Z72.0 TOBACCO USER: ICD-10-CM

## 2025-05-28 PROCEDURE — 99214 OFFICE O/P EST MOD 30 MIN: CPT | Mod: ,,, | Performed by: NURSE PRACTITIONER

## 2025-05-28 RX ORDER — BREXPIPRAZOLE 1 MG/1
1 TABLET ORAL DAILY
COMMUNITY
End: 2025-05-28 | Stop reason: SDDI

## 2025-05-28 RX ORDER — CLONAZEPAM 1 MG/1
1 TABLET ORAL DAILY PRN
Qty: 30 TABLET | Refills: 0 | Status: SHIPPED | OUTPATIENT
Start: 2025-05-28

## 2025-05-28 RX ORDER — TOPIRAMATE 100 MG/1
100 TABLET, FILM COATED ORAL DAILY
Qty: 60 TABLET | Refills: 2 | Status: SHIPPED | OUTPATIENT
Start: 2025-05-28
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
## 2025-06-27 DIAGNOSIS — G47.00 INSOMNIA, UNSPECIFIED TYPE: ICD-10-CM

## 2025-06-27 RX ORDER — AMITRIPTYLINE HYDROCHLORIDE 150 MG/1
TABLET ORAL
Qty: 30 TABLET | Refills: 1 | Status: SHIPPED | OUTPATIENT
Start: 2025-06-27

## 2025-07-17 ENCOUNTER — OFFICE VISIT (OUTPATIENT)
Dept: BEHAVIORAL HEALTH | Facility: CLINIC | Age: 50
End: 2025-07-17

## 2025-07-17 VITALS
BODY MASS INDEX: 39.65 KG/M2 | HEART RATE: 112 BPM | TEMPERATURE: 97 F | OXYGEN SATURATION: 97 % | HEIGHT: 60 IN | DIASTOLIC BLOOD PRESSURE: 78 MMHG | SYSTOLIC BLOOD PRESSURE: 134 MMHG | WEIGHT: 201.94 LBS

## 2025-07-17 DIAGNOSIS — F33.1 MODERATE EPISODE OF RECURRENT MAJOR DEPRESSIVE DISORDER: Primary | ICD-10-CM

## 2025-07-17 DIAGNOSIS — F41.1 GENERALIZED ANXIETY DISORDER: ICD-10-CM

## 2025-07-17 DIAGNOSIS — Z79.899 MEDICAL MARIJUANA USE: ICD-10-CM

## 2025-07-17 DIAGNOSIS — Z72.0 TOBACCO USER: ICD-10-CM

## 2025-07-17 DIAGNOSIS — G47.00 INSOMNIA, UNSPECIFIED TYPE: ICD-10-CM

## 2025-07-17 PROCEDURE — 99212 OFFICE O/P EST SF 10 MIN: CPT | Mod: ,,, | Performed by: NURSE PRACTITIONER

## 2025-07-17 RX ORDER — AMITRIPTYLINE HYDROCHLORIDE 150 MG/1
150 TABLET ORAL NIGHTLY
Qty: 30 TABLET | Refills: 5 | Status: SHIPPED | OUTPATIENT
Start: 2025-07-17

## 2025-07-17 RX ORDER — TOPIRAMATE 100 MG/1
100 TABLET, FILM COATED ORAL DAILY
Qty: 30 TABLET | Refills: 5 | Status: SHIPPED | OUTPATIENT
Start: 2025-07-17

## 2025-07-17 RX ORDER — CLONAZEPAM 1 MG/1
1 TABLET ORAL DAILY PRN
Qty: 30 TABLET | Refills: 5 | Status: SHIPPED | OUTPATIENT
Start: 2025-07-17
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
## 2025-07-25 DIAGNOSIS — M54.12 CHRONIC CERVICAL RADICULOPATHY: Primary | ICD-10-CM

## 2025-07-25 RX ORDER — CYCLOBENZAPRINE HCL 5 MG
TABLET ORAL
Qty: 30 TABLET | Refills: 0 | Status: SHIPPED | OUTPATIENT
Start: 2025-07-25

## 2025-08-14 ENCOUNTER — TELEPHONE (OUTPATIENT)
Dept: FAMILY MEDICINE | Facility: CLINIC | Age: 50
End: 2025-08-14